# Patient Record
Sex: MALE | Race: BLACK OR AFRICAN AMERICAN | Employment: OTHER | URBAN - METROPOLITAN AREA
[De-identification: names, ages, dates, MRNs, and addresses within clinical notes are randomized per-mention and may not be internally consistent; named-entity substitution may affect disease eponyms.]

---

## 2017-02-02 ENCOUNTER — OFFICE VISIT (OUTPATIENT)
Dept: CARDIOLOGY CLINIC | Age: 82
End: 2017-02-02

## 2017-02-02 VITALS
WEIGHT: 174 LBS | HEART RATE: 64 BPM | SYSTOLIC BLOOD PRESSURE: 177 MMHG | DIASTOLIC BLOOD PRESSURE: 57 MMHG | BODY MASS INDEX: 28.99 KG/M2 | HEIGHT: 65 IN

## 2017-02-02 DIAGNOSIS — Z95.1 POSTSURGICAL AORTOCORONARY BYPASS STATUS: ICD-10-CM

## 2017-02-02 DIAGNOSIS — I34.0 NON-RHEUMATIC MITRAL REGURGITATION: ICD-10-CM

## 2017-02-02 DIAGNOSIS — I10 ESSENTIAL HYPERTENSION: ICD-10-CM

## 2017-02-02 DIAGNOSIS — I25.10 ATHEROSCLEROSIS OF NATIVE CORONARY ARTERY OF NATIVE HEART WITHOUT ANGINA PECTORIS: Primary | ICD-10-CM

## 2017-02-02 DIAGNOSIS — N18.6 ESRD (END STAGE RENAL DISEASE) (HCC): ICD-10-CM

## 2017-02-02 DIAGNOSIS — E78.00 PURE HYPERCHOLESTEROLEMIA: ICD-10-CM

## 2017-02-02 RX ORDER — AMLODIPINE BESYLATE 10 MG/1
10 TABLET ORAL DAILY
Qty: 90 TAB | Refills: 1 | Status: SHIPPED | OUTPATIENT
Start: 2017-02-02 | End: 2018-05-02 | Stop reason: SDUPTHER

## 2017-02-02 NOTE — PATIENT INSTRUCTIONS
After the recommended changes have been made in blood pressure medicines, patient advised to keep BP/HR(pulse rate) chart twice daily and bring us results in next 2 weeks or so. Patient may send the results via \"My Chart\" if desired. Please rest for 5-10 minutes before checking blood pressure  Medications Discontinued During This Encounter   Medication Reason    furosemide (LASIX) 80 mg tablet Duplicate Order    doxazosin (CARDURA) 2 mg tablet Discontinued by Another Clinician    ferrous sulfate 325 mg (65 mg iron) tablet Therapy Completed    oxyCODONE-acetaminophen (PERCOCET) 5-325 mg per tablet Therapy Completed    methocarbamol (ROBAXIN-750) 750 mg tablet Therapy Completed    metolazone (ZAROXOLYN) 2.5 mg tablet Therapy Completed    FOLIC ACID/VITAMIN B COMP W-C (TESSY-MARCK PO) Not A Current Medication    potassium chloride SR (KLOR-CON 10) 10 mEq tablet Not A Current Medication    amLODIPine (NORVASC) 5 mg tablet Reorder       Orders Placed This Encounter    AMB POC EKG ROUTINE W/ 12 LEADS, INTER & REP     Order Specific Question:   Reason for Exam:     Answer:   routine    amLODIPine (NORVASC) 10 mg tablet     Sig: Take 1 Tab by mouth daily. Indications: hypertension     Dispense:  90 Tab     Refill:  1          Learning About Coronary Artery Disease (CAD)  What is coronary artery disease? Coronary artery disease (CAD) occurs when plaque builds up in the arteries that bring oxygen-rich blood to your heart. Plaque is a fatty substance made of cholesterol, calcium, and other substances in the blood. This process is called hardening of the arteries, or atherosclerosis. What happens when you have coronary artery disease? · Plaque may narrow the coronary arteries. Narrowed arteries cause poor blood flow. This can lead to angina symptoms such as chest pain or discomfort. If blood flow is completely blocked, you could have a heart attack.   · You can slow CAD and reduce the risk of future problems by making changes in your lifestyle. These include quitting smoking and eating heart-healthy foods. · Treatments for CAD, along with changes in your lifestyle, can help you live a longer and healthier life. How can you prevent coronary artery disease? · Do not smoke. It may be the best thing you can do to prevent heart disease. If you need help quitting, talk to your doctor about stop-smoking programs and medicines. These can increase your chances of quitting for good. · Be active. Get at least 30 minutes of exercise on most days of the week. Walking is a good choice. You also may want to do other activities, such as running, swimming, cycling, or playing tennis or team sports. · Eat heart-healthy foods. Eat more fruits and vegetables and less foods that contain saturated and trans fats. Limit alcohol, sodium, and sweets. · Stay at a healthy weight. Lose weight if you need to. · Manage other health problems such as diabetes, high blood pressure, and high cholesterol. · Manage stress. Stress can hurt your heart. To keep stress low, talk about your problems and feelings. Don't keep your feelings hidden. · If you have talked about it with your doctor, take a low-dose aspirin every day. Aspirin can help certain people lower their risk of a heart attack or stroke. But taking aspirin isn't right for everyone, because it can cause serious bleeding. Do not start taking daily aspirin unless your doctor knows about it. How is coronary artery disease treated? · Your doctor will suggest that you make lifestyle changes. For example, your doctor may ask you to eat healthy foods, quit smoking, lose extra weight, and be more active. · You will have to take medicines. · Your doctor may suggest a procedure to open narrowed or blocked arteries. This is called angioplasty. Or your doctor may suggest using healthy blood vessels to create detours around narrowed or blocked arteries. This is called bypass surgery.   Follow-up care is a key part of your treatment and safety. Be sure to make and go to all appointments, and call your doctor if you are having problems. It's also a good idea to know your test results and keep a list of the medicines you take. Where can you learn more? Go to http://gustavo-eb.info/. Enter [phone] in the search box to learn more about \"Learning About Coronary Artery Disease (CAD). \"  Current as of: January 27, 2016  Content Version: 11.1  © 8170-9574 [employer]. Care instructions adapted under license by [employer] (which disclaims liability or warranty for this information). If you have questions about a medical condition or this instruction, always ask your healthcare professional. [address] any warranty or liability for your use of this information.

## 2017-02-02 NOTE — LETTER
Dilia Atwood 1935 2/2/2017 Dear MD Carlos Enrique Bueno MD 
 
I had the pleasure of evaluating  Mr. Nadira Giordano in office today. Below are the relevant portions of my assessment and plan of care. ICD-10-CM ICD-9-CM 1. Atherosclerosis of native coronary artery of native heart without angina pectoris I25.10 414.01 AMB POC EKG ROUTINE W/ 12 LEADS, INTER & REP 2. Postsurgical aortocoronary bypass status Z95.1 V45.81   
3. Essential hypertension I10 401.9 amLODIPine (NORVASC) 10 mg tablet 2/17 incr norvasc to 10;   
4. Pure hypercholesterolemia E78.00 272.0   
 8/16 LDL35; 5/16 LDL32; 12/15 LDL21;  
5. Non-rheumatic mitral regurgitation I34.0 424.0   
 2/10 trace MR; 5/14 mild MR 
  
6. ESRD (end stage renal disease) ([facility] [address] N18.6 585.6 HD since 9/16 approx Current Outpatient Prescriptions Medication Sig Dispense Refill  amLODIPine (NORVASC) 10 mg tablet Take 1 Tab by mouth daily. Indications: hypertension 90 Tab 1  
 tamsulosin (FLOMAX) 0.4 mg capsule TAKE ONE CAPSULE BY MOUTH EVERY DAY 90 Cap 0  
 metoprolol succinate (TOPROL-XL) 25 mg XL tablet TAKE 1 TABLET BY MOUTH DAILY 30 Tab 6  furosemide (LASIX) 80 mg tablet TAKE 1 TABLET BY MOUTH DAILY 30 Tab 3  
 sevelamer carbonate (RENVELA) 800 mg tab tab Take 1,600 mg by mouth three (3) times daily.  allopurinol (ZYLOPRIM) 100 mg tablet Take 100 mg by mouth Three (3) times a week. Mondays Wednesday Fridays  atorvastatin (LIPITOR) 80 mg tablet Take 80 mg by mouth nightly. Orders Placed This Encounter  AMB POC EKG ROUTINE W/ 12 LEADS, INTER & REP Order Specific Question:   Reason for Exam: Answer:   routine  amLODIPine (NORVASC) 10 mg tablet Sig: Take 1 Tab by mouth daily. Indications: hypertension Dispense:  90 Tab Refill:  1 If you have questions, please do not hesitate to call me. I look forward to following Mr. Nadira Giordano along with you.  
 
Sincerely, 
 Marlyn Martin MD

## 2017-02-02 NOTE — PROGRESS NOTES
1. Have you been to the ER, urgent care clinic since your last visit? Hospitalized since your last visit? Yes 12/3/16    2. Have you seen or consulted any other health care providers outside of the [address] [place] [address] since your last visit? Include any pap smears or colon screening. No     3. Since your last visit, have you had any of the following symptoms? Swelling in legs    4. Have you had any blood work, X-rays or cardiac testing? Labs in [facility]    5. Where do you normally have your labs drawn? [address]      6. Do you need any refills today?  No     Medications confirmed by patient's medication list

## 2017-02-02 NOTE — PROGRESS NOTES
HISTORY OF PRESENT ILLNESS  Mason Rod is a [de-identified] y.o. male. HPI Comments: F/U od CAD, HTN, HLD, MR, s/p CABG    2/17 HD started approx 9/16. Meds being adjusted and reduced for HTN    Valvular Heart Disease   The history is provided by the medical records (mr). Pertinent negatives include no chest pain, no headaches and no shortness of breath. Leg Swelling   The history is provided by the patient. This is a recurrent problem. The current episode started more than 1 week ago. The problem occurs daily. The problem has been resolved (since HD started). Pertinent negatives include no chest pain, no headaches and no shortness of breath. The symptoms are aggravated by standing. The symptoms are relieved by sleep. Review of Systems   Constitutional: Negative for chills, fever, malaise/fatigue and weight loss. HENT: Negative for nosebleeds. Eyes: Negative for discharge. Respiratory: Negative for cough, shortness of breath and wheezing. Cardiovascular: Positive for leg swelling. Negative for chest pain, palpitations, orthopnea, claudication and PND. Gastrointestinal: Negative for diarrhea, nausea and vomiting. Genitourinary: Negative for dysuria and hematuria. Musculoskeletal: Negative for joint pain. Skin: Negative for rash. Neurological: Negative for dizziness, seizures, loss of consciousness and headaches. Endo/Heme/Allergies: Negative for polydipsia. Does not bruise/bleed easily. Psychiatric/Behavioral: Negative for depression and substance abuse. The patient does not have insomnia.       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 Miriam Mckeon Bradycardia 12/21/2015     12/15 lopressor d/neela by Dr Junior Burgos, was taking 100 mg will start 25 mg and watch for tamara     CAD (coronary artery disease)     Chronic kidney disease     Coronary atherosclerosis of native coronary artery      Stable symptoms    Essential hypertension, benign      Controlled    Hypercholesteremia     Mitral valve disorders     Nausea & vomiting     Other and unspecified hyperlipidemia      3/12 Low density lipoproteins (LDLs) are at goal, triglycerides are at goal, high density lipoproteins (HDLs) are at goal    Postsurgical aortocoronary bypass status 1991    Prostate disease        Family History   Problem Relation Age of Onset    Heart Disease Neg Hx     Heart Attack Neg Hx     Sudden Death Neg Hx     Stroke Neg Hx        Social History   Substance Use Topics    Smoking status: Former Smoker     Quit date: 11/1/1983    Smokeless tobacco: Never Used    Alcohol use No        Current Outpatient Prescriptions   Medication Sig    tamsulosin (FLOMAX) 0.4 mg capsule TAKE ONE CAPSULE BY MOUTH EVERY DAY    amLODIPine (NORVASC) 5 mg tablet Take 1 Tab by mouth daily. Indications: HYPERTENSION    metoprolol succinate (TOPROL-XL) 25 mg XL tablet TAKE 1 TABLET BY MOUTH DAILY    furosemide (LASIX) 80 mg tablet TAKE 1 TABLET BY MOUTH DAILY    sevelamer carbonate (RENVELA) 800 mg tab tab Take 1,600 mg by mouth three (3) times daily.  allopurinol (ZYLOPRIM) 100 mg tablet Take 100 mg by mouth Three (3) times a week. Mondays Wednesday Fridays    atorvastatin (LIPITOR) 80 mg tablet Take 80 mg by mouth nightly. No current facility-administered medications for this visit.          Past Surgical History   Procedure Laterality Date    Hx coronary artery bypass graft  1991     x 1    Pr cardiac surg procedure unlist         Diagnostic Studies:  CARDIOLOGY STUDIES 6/12/2015 5/23/2014 2/1/2010   Myocardial Perfusion Scan Result - - nl scan, EF 63%   Pharmacological Nuclear Stress Test Result nl scan, 72%EF - -   Echocardiogram - Complete Result - 55%EF, mod DD, mild MR, Ao root 3.9, asc ao 3.8 trace MR, trace TR, PAP 22       Visit Vitals    /57    Pulse 64    Ht 5' 5\" (1.651 m)    Wt 78.9 kg (174 lb)    BMI 28.96 kg/m2       Mr. Alanis Serna has a reminder for a \"due or due soon\" health maintenance. I have asked that he contact his primary care provider for follow-up on this health maintenance. Physical Exam   Constitutional: He is oriented to person, place, and time. He appears well-developed and well-nourished. No distress. HENT:   Head: Normocephalic and atraumatic. Mouth/Throat: Normal dentition. Eyes: Right eye exhibits no discharge. Left eye exhibits no discharge. No scleral icterus. Neck: Neck supple. No JVD present. Carotid bruit is not present. No thyromegaly present. Cardiovascular: Normal rate, regular rhythm, S1 normal, S2 normal and intact distal pulses. Exam reveals no gallop and no friction rub. Murmur heard. Blowing early systolic murmur is present with a grade of 3/6  at the upper right sternal border, apex radiating to the neck  Pulmonary/Chest: Effort normal and breath sounds normal. He has no wheezes. He has no rales. Abdominal: Soft. He exhibits no mass. There is no tenderness. Musculoskeletal: He exhibits no edema. Lymphadenopathy:        Right cervical: No superficial cervical adenopathy present. Left cervical: No superficial cervical adenopathy present. Neurological: He is alert and oriented to person, place, and time. Skin: Skin is warm and dry. No rash noted. Psychiatric: He has a normal mood and affect. His behavior is normal.       ASSESSMENT and Henrocio Gonsalves was seen today for coronary artery disease, valvular heart disease, heart murmur and leg swelling.     Diagnoses and all orders for this visit:    Atherosclerosis of native coronary artery of native heart without angina pectoris  -     AMB POC EKG ROUTINE W/ 12 LEADS, INTER & REP    Postsurgical aortocoronary bypass status    Essential hypertension  Comments:  2/17 larissa tovarc to 10;   Orders:  -     amLODIPine (NORVASC) 10 mg tablet; Take 1 Tab by mouth daily. Indications: hypertension    Pure hypercholesterolemia  Comments:  8/16 LDL35; 5/16 LDL32; 12/15 LDL21;    Non-rheumatic mitral regurgitation  Comments:  2/10 trace MR; 5/14 mild MR      ESRD (end stage renal disease) ([facility] [address]  Comments:  HD since 9/16 approx          Pertinent laboratory and test data reviewed and discussed with patient. See patient instructions also for other medical advice given    Medications Discontinued During This Encounter   Medication Reason    furosemide (LASIX) 80 mg tablet Duplicate Order    doxazosin (CARDURA) 2 mg tablet Discontinued by Another Clinician    ferrous sulfate 325 mg (65 mg iron) tablet Therapy Completed    oxyCODONE-acetaminophen (PERCOCET) 5-325 mg per tablet Therapy Completed    methocarbamol (ROBAXIN-750) 750 mg tablet Therapy Completed    metolazone (ZAROXOLYN) 2.5 mg tablet Therapy Completed    FOLIC ACID/VITAMIN B COMP W-C (TESSY-MARCK PO) Not A Current Medication    potassium chloride SR (KLOR-CON 10) 10 mEq tablet Not A Current Medication    amLODIPine (NORVASC) 5 mg tablet Reorder       Follow-up Disposition:  Return in about 6 months (around 8/2/2017), or if symptoms worsen or fail to improve.

## 2017-02-02 NOTE — MR AVS SNAPSHOT
Visit Information Date & Time Provider Department Dept. Phone Encounter #  
 2/2/2017  8:30 AM Gilmer Parker MD [facility] [address] [place] [address] [id number] Follow-up Instructions Return in about 6 months (around 8/2/2017), or if symptoms worsen or fail to improve. Your Appointments 8/17/2017  8:15 AM  
ESTABLISHED PATIENT with Gilmer Parker MD  
[facility]) Appt Note: 6 months [address] [facility], [address] [place] [address]  
[address], [address] [place] [address] Upcoming Health Maintenance Date Due  
 FOOT EXAM Q1 12/27/1945 MICROALBUMIN Q1 12/27/1945 EYE EXAM RETINAL OR DILATED Q1 12/27/1946 DTaP/Tdap/Td series (1 - Tdap) 12/27/1957 ZOSTER VACCINE AGE 60> 12/27/1996 GLAUCOMA SCREENING Q2Y 12/27/2001 Pneumococcal 65+ High/Highest Risk (1 of 2 - PCV13) 12/27/2001 MEDICARE YEARLY EXAM 12/27/2001 INFLUENZA AGE 9 TO ADULT 8/1/2016 HEMOGLOBIN A1C Q6M 2/8/2017 LIPID PANEL Q1 8/8/2017 Allergies as of 2/2/2017  Review Complete On: 2/2/2017 By: Gilmer Parker MD  
  
 Severity Noted Reaction Type Reactions Other Medication  05/09/2013    Unknown (comments) ANESTHESIA Current Immunizations  Never Reviewed No immunizations on file. Not reviewed this visit You Were Diagnosed With   
  
 Codes Comments Atherosclerosis of native coronary artery of native heart without angina pectoris    -  Primary ICD-10-CM: I25.10 ICD-9-CM: 414.01 Postsurgical aortocoronary bypass status     ICD-10-CM: Z95.1 ICD-9-CM: V45.81 Essential hypertension     ICD-10-CM: I10 
ICD-9-CM: 401.9 2/17 incr norvasc to 10;   
 Pure hypercholesterolemia     ICD-10-CM: E78.00 ICD-9-CM: 272.0 8/16 LDL35; 5/16 LDL32; 12/15 LDL21;  
 Non-rheumatic mitral regurgitation     ICD-10-CM: I34.0 ICD-9-CM: 424.0 2/10 trace MR; 5/14 mild MR 
  
 [facility]     ICD-10-CM: N18.6 ICD-9-CM: 585.6 HD since 9/16 approx Vitals BP Pulse Height(growth percentile) Weight(growth percentile) BMI Smoking Status 177/57 64 5' 5\" (1.651 m) 174 lb (78.9 kg) 28.96 kg/m2 Former Smoker Vitals History BMI and BSA Data Body Mass Index Body Surface Area  
 28.96 kg/m 2 1.9 m 2 Preferred Pharmacy Pharmacy Name Phone [facility] [address] [place] [address] [phone] Your Updated Medication List  
  
   
This list is accurate as of: 2/2/17  8:58 AM.  Always use your most recent med list.  
  
  
  
  
 allopurinol 100 mg tablet Commonly known as:  Floresita Avinash Take 100 mg by mouth Three (3) times a week. Mondays Wednesday Fridays  
  
 amLODIPine 10 mg tablet Commonly known as:  Love Breach Take 1 Tab by mouth daily. Indications: hypertension  
  
 atorvastatin 80 mg tablet Commonly known as:  LIPITOR Take 80 mg by mouth nightly. furosemide 80 mg tablet Commonly known as:  LASIX TAKE 1 TABLET BY MOUTH DAILY  
  
 metoprolol succinate 25 mg XL tablet Commonly known as:  TOPROL-XL  
TAKE 1 TABLET BY MOUTH DAILY RENVELA 800 mg Tab tab Generic drug:  sevelamer carbonate Take 1,600 mg by mouth three (3) times daily. tamsulosin 0.4 mg capsule Commonly known as:  FLOMAX TAKE ONE CAPSULE BY MOUTH EVERY DAY Prescriptions Sent to Pharmacy Refills  
 amLODIPine (NORVASC) 10 mg tablet 1 Sig: Take 1 Tab by mouth daily. Indications: hypertension Class: Normal  
 Pharmacy: [facility] [address] [place] [address] Ph #: [phone] Route: Oral  
  
We Performed the Following AMB POC EKG ROUTINE W/ 12 LEADS, INTER & REP [16315 CPT(R)] Follow-up Instructions  Return in about 6 months (around 8/2/2017), or if symptoms worsen or fail to improve. Patient Instructions After the recommended changes have been made in blood pressure medicines, patient advised to keep BP/HR(pulse rate) chart twice daily and bring us results in next 2 weeks or so. Patient may send the results via \"My Chart\" if desired. Please rest for 5-10 minutes before checking blood pressure Medications Discontinued During This Encounter Medication Reason  furosemide (LASIX) 80 mg tablet Duplicate Order  doxazosin (CARDURA) 2 mg tablet Discontinued by Another Clinician  ferrous sulfate 325 mg (65 mg iron) tablet Therapy Completed  oxyCODONE-acetaminophen (PERCOCET) 5-325 mg per tablet Therapy Completed  methocarbamol (ROBAXIN-750) 750 mg tablet Therapy Completed  metolazone (ZAROXOLYN) 2.5 mg tablet Therapy Completed  FOLIC ACID/VITAMIN B COMP W-C (TESSY-MARCK PO) Not A Current Medication  potassium chloride SR (KLOR-CON 10) 10 mEq tablet Not A Current Medication  amLODIPine (NORVASC) 5 mg tablet Reorder Orders Placed This Encounter  AMB POC EKG ROUTINE W/ 12 LEADS, INTER & REP Order Specific Question:   Reason for Exam: Answer:   routine  amLODIPine (NORVASC) 10 mg tablet Sig: Take 1 Tab by mouth daily. Indications: hypertension Dispense:  90 Tab Refill:  1 Learning About Coronary Artery Disease (CAD) What is coronary artery disease? Coronary artery disease (CAD) occurs when plaque builds up in the arteries that bring oxygen-rich blood to your heart. Plaque is a fatty substance made of cholesterol, calcium, and other substances in the blood. This process is called hardening of the arteries, or atherosclerosis. What happens when you have coronary artery disease? · Plaque may narrow the coronary arteries. Narrowed arteries cause poor blood flow. This can lead to angina symptoms such as chest pain or discomfort. If blood flow is completely blocked, you could have a heart attack. · You can slow CAD and reduce the risk of future problems by making changes in your lifestyle. These include quitting smoking and eating heart-healthy foods. · Treatments for CAD, along with changes in your lifestyle, can help you live a longer and healthier life. How can you prevent coronary artery disease? · Do not smoke. It may be the best thing you can do to prevent heart disease. If you need help quitting, talk to your doctor about stop-smoking programs and medicines. These can increase your chances of quitting for good. · Be active. Get at least 30 minutes of exercise on most days of the week. Walking is a good choice. You also may want to do other activities, such as running, swimming, cycling, or playing tennis or team sports. · Eat heart-healthy foods. Eat more fruits and vegetables and less foods that contain saturated and trans fats. Limit alcohol, sodium, and sweets. · Stay at a healthy weight. Lose weight if you need to. · Manage other health problems such as diabetes, high blood pressure, and high cholesterol. · Manage stress. Stress can hurt your heart. To keep stress low, talk about your problems and feelings. Don't keep your feelings hidden. · If you have talked about it with your doctor, take a low-dose aspirin every day. Aspirin can help certain people lower their risk of a heart attack or stroke. But taking aspirin isn't right for everyone, because it can cause serious bleeding. Do not start taking daily aspirin unless your doctor knows about it. How is coronary artery disease treated? · Your doctor will suggest that you make lifestyle changes. For example, your doctor may ask you to eat healthy foods, quit smoking, lose extra weight, and be more active. · You will have to take medicines. · Your doctor may suggest a procedure to open narrowed or blocked arteries. This is called angioplasty.  Or your doctor may suggest using healthy blood vessels to create detours around narrowed or blocked arteries. This is called bypass surgery. Follow-up care is a key part of your treatment and safety. Be sure to make and go to all appointments, and call your doctor if you are having problems. It's also a good idea to know your test results and keep a list of the medicines you take. Where can you learn more? Go to http://gustavo-eb.info/. Enter ([phone] in the search box to learn more about \"Learning About Coronary Artery Disease (CAD). \" Current as of: January 27, 2016 Content Version: 11.1 © 6186-9970 [employer]. Care instructions adapted under license by [employer] (which disclaims liability or warranty for this information). If you have questions about a medical condition or this instruction, always ask your healthcare professional. [address] any warranty or liability for your use of this information. Please provide this summary of care documentation to your next provider. Your primary care clinician is listed as Donovan Ho. If you have any questions after today's visit, please call [phone].

## 2017-03-02 ENCOUNTER — TELEPHONE (OUTPATIENT)
Dept: CARDIOLOGY CLINIC | Age: 82
End: 2017-03-02

## 2017-03-02 NOTE — TELEPHONE ENCOUNTER
Daily blood pressure log 2/16 -3/1/17    Per Dr. Kishore Lozoya reviewed BP log and wrote on log to continue  Norvasc 10 mg 1 tab daily/ BP chart 1 week/ bring in late march

## 2017-03-10 ENCOUNTER — DOCUMENTATION ONLY (OUTPATIENT)
Dept: VASCULAR SURGERY | Age: 82
End: 2017-03-10

## 2017-03-23 ENCOUNTER — HOSPITAL ENCOUNTER (OUTPATIENT)
Dept: CARDIAC CATH/INVASIVE PROCEDURES | Age: 82
Discharge: HOME | End: 2017-03-23
Attending: SURGERY | Admitting: SURGERY
Payer: MEDICARE

## 2017-03-23 VITALS
DIASTOLIC BLOOD PRESSURE: 55 MMHG | TEMPERATURE: 98.1 F | RESPIRATION RATE: 22 BRPM | SYSTOLIC BLOOD PRESSURE: 164 MMHG | OXYGEN SATURATION: 97 % | HEIGHT: 66 IN | BODY MASS INDEX: 28.12 KG/M2 | WEIGHT: 175 LBS | HEART RATE: 62 BPM

## 2017-03-23 LAB
BUN BLD-MCNC: 30 MG/DL (ref 7–18)
CHLORIDE BLD-SCNC: 109 MMOL/L (ref 100–108)
GLUCOSE BLD STRIP.AUTO-MCNC: 94 MG/DL (ref 74–106)
HCT VFR BLD CALC: 44 % (ref 36–49)
HGB BLD-MCNC: 15 G/DL (ref 12–16)
POTASSIUM BLD-SCNC: 4 MMOL/L (ref 3.5–5.5)
SODIUM BLD-SCNC: 135 MMOL/L (ref 136–145)

## 2017-03-23 PROCEDURE — C1894 INTRO/SHEATH, NON-LASER: HCPCS

## 2017-03-23 PROCEDURE — 36903 INTRO CATH DIALYSIS CIRCUIT: CPT

## 2017-03-23 PROCEDURE — 85014 HEMATOCRIT: CPT

## 2017-03-23 PROCEDURE — 74011000250 HC RX REV CODE- 250: Performed by: SURGERY

## 2017-03-23 PROCEDURE — 99152 MOD SED SAME PHYS/QHP 5/>YRS: CPT

## 2017-03-23 PROCEDURE — 77030037100 HC DEV INFL ANGIO PRESTO DISP BARD -B

## 2017-03-23 PROCEDURE — 74011250636 HC RX REV CODE- 250/636: Performed by: SURGERY

## 2017-03-23 PROCEDURE — 74011636320 HC RX REV CODE- 636/320: Performed by: SURGERY

## 2017-03-23 PROCEDURE — 99153 MOD SED SAME PHYS/QHP EA: CPT

## 2017-03-23 PROCEDURE — 76937 US GUIDE VASCULAR ACCESS: CPT

## 2017-03-23 PROCEDURE — C1725 CATH, TRANSLUMIN NON-LASER: HCPCS

## 2017-03-23 PROCEDURE — C1769 GUIDE WIRE: HCPCS

## 2017-03-23 PROCEDURE — 77030002916 HC SUT ETHLN J&J -A

## 2017-03-23 RX ORDER — LIDOCAINE HYDROCHLORIDE 10 MG/ML
1-30 INJECTION, SOLUTION EPIDURAL; INFILTRATION; INTRACAUDAL; PERINEURAL
Status: DISCONTINUED | OUTPATIENT
Start: 2017-03-23 | End: 2017-03-23 | Stop reason: HOSPADM

## 2017-03-23 RX ORDER — SODIUM CHLORIDE 0.9 % (FLUSH) 0.9 %
5-10 SYRINGE (ML) INJECTION EVERY 8 HOURS
Status: DISCONTINUED | OUTPATIENT
Start: 2017-03-23 | End: 2017-03-23 | Stop reason: HOSPADM

## 2017-03-23 RX ORDER — MIDAZOLAM HYDROCHLORIDE 1 MG/ML
1-4 INJECTION, SOLUTION INTRAMUSCULAR; INTRAVENOUS
Status: DISCONTINUED | OUTPATIENT
Start: 2017-03-23 | End: 2017-03-23 | Stop reason: HOSPADM

## 2017-03-23 RX ORDER — MORPHINE SULFATE 10 MG/ML
1 INJECTION, SOLUTION INTRAMUSCULAR; INTRAVENOUS
Status: DISCONTINUED | OUTPATIENT
Start: 2017-03-23 | End: 2017-03-23 | Stop reason: HOSPADM

## 2017-03-23 RX ORDER — OXYCODONE AND ACETAMINOPHEN 5; 325 MG/1; MG/1
1 TABLET ORAL
Status: DISCONTINUED | OUTPATIENT
Start: 2017-03-23 | End: 2017-03-23 | Stop reason: HOSPADM

## 2017-03-23 RX ORDER — ACETAMINOPHEN 325 MG/1
650 TABLET ORAL
Status: DISCONTINUED | OUTPATIENT
Start: 2017-03-23 | End: 2017-03-23 | Stop reason: HOSPADM

## 2017-03-23 RX ORDER — HEPARIN SODIUM 1000 [USP'U]/ML
1000-10000 INJECTION, SOLUTION INTRAVENOUS; SUBCUTANEOUS
Status: DISCONTINUED | OUTPATIENT
Start: 2017-03-23 | End: 2017-03-23 | Stop reason: HOSPADM

## 2017-03-23 RX ORDER — HEPARIN SODIUM 200 [USP'U]/100ML
1000 INJECTION, SOLUTION INTRAVENOUS ONCE
Status: COMPLETED | OUTPATIENT
Start: 2017-03-23 | End: 2017-03-23

## 2017-03-23 RX ORDER — ONDANSETRON 2 MG/ML
4 INJECTION INTRAMUSCULAR; INTRAVENOUS
Status: DISCONTINUED | OUTPATIENT
Start: 2017-03-23 | End: 2017-03-23 | Stop reason: HOSPADM

## 2017-03-23 RX ORDER — FENTANYL CITRATE 50 UG/ML
25-100 INJECTION, SOLUTION INTRAMUSCULAR; INTRAVENOUS
Status: DISCONTINUED | OUTPATIENT
Start: 2017-03-23 | End: 2017-03-23 | Stop reason: HOSPADM

## 2017-03-23 RX ORDER — DIPHENHYDRAMINE HYDROCHLORIDE 50 MG/ML
12.5 INJECTION, SOLUTION INTRAMUSCULAR; INTRAVENOUS
Status: DISCONTINUED | OUTPATIENT
Start: 2017-03-23 | End: 2017-03-23 | Stop reason: HOSPADM

## 2017-03-23 RX ORDER — SODIUM CHLORIDE 0.9 % (FLUSH) 0.9 %
5-10 SYRINGE (ML) INJECTION AS NEEDED
Status: DISCONTINUED | OUTPATIENT
Start: 2017-03-23 | End: 2017-03-23 | Stop reason: HOSPADM

## 2017-03-23 RX ADMIN — HEPARIN SODIUM 3000 UNITS: 1000 INJECTION, SOLUTION INTRAVENOUS; SUBCUTANEOUS at 13:54

## 2017-03-23 RX ADMIN — MIDAZOLAM HYDROCHLORIDE 1 MG: 1 INJECTION, SOLUTION INTRAMUSCULAR; INTRAVENOUS at 13:42

## 2017-03-23 RX ADMIN — IOPAMIDOL 15 ML: 612 INJECTION, SOLUTION INTRAVENOUS at 14:10

## 2017-03-23 RX ADMIN — FENTANYL CITRATE 50 MCG: 50 INJECTION INTRAMUSCULAR; INTRAVENOUS at 13:55

## 2017-03-23 RX ADMIN — LIDOCAINE HYDROCHLORIDE 2 ML: 10 INJECTION, SOLUTION EPIDURAL; INFILTRATION; INTRACAUDAL; PERINEURAL at 13:48

## 2017-03-23 RX ADMIN — MIDAZOLAM HYDROCHLORIDE 1 MG: 1 INJECTION, SOLUTION INTRAMUSCULAR; INTRAVENOUS at 13:55

## 2017-03-23 RX ADMIN — FENTANYL CITRATE 50 MCG: 50 INJECTION INTRAMUSCULAR; INTRAVENOUS at 13:42

## 2017-03-23 RX ADMIN — HEPARIN SODIUM 1000 UNITS: 200 INJECTION, SOLUTION INTRAVENOUS at 13:49

## 2017-03-23 NOTE — ROUTINE PROCESS
Pt discharge instructions reviewed with patient and daughter; verbalize understanding. PIV removed, fistula site c/d/i. Discharged home.

## 2017-03-23 NOTE — DISCHARGE INSTRUCTIONS
Your Hemodialysis Access: Care Instructions  Your Care Instructions  Hemodialysis, or dialysis, is the use of a machine to remove wastes from your blood. You need it if your kidneys are not able to remove wastes on their own. A dialysis access is the place in your arm, or sometimes in your leg, where a doctor creates a blood vessel that carries a large flow of blood. When you have dialysis, two needles are placed in this blood vessel and are connected to the dialysis machine. Your blood flows out of one needle and into the machine to be cleaned. Then your cleaned blood flows back into your body through the other needle. Sometimes, a doctor makes a short-term access through a tube, called a catheter, placed in your neck, upper chest, or groin. Your doctor creates an access during a minor surgery. You need to take care of your access to keep it working and to prevent infection. Follow-up care is a key part of your treatment and safety. Be sure to make and go to all appointments, and call your doctor if you are having problems. Its also a good idea to know your test results and keep a list of the medicines you take. How can you care for yourself at home? · After your doctor creates an access, keep it dry for at least 2 days. · Squeeze a soft ball or other object as instructed after the access is placed. This will help blood flow through the access and help prevent blood clots. · After you have dialysis, check to see whether the access bleeds or swells. Let your doctor know if your arm bleeds or swells. · Do not lift anything heavy with the arm that has the access. · Do not bump your arm. · Do not wear tight clothing or jewelry over the access. · Do not sleep with your access arm under your body. · Have blood drawn or blood pressure taken from your other arm. · Keep the access clean and dry. · Do not put cream or lotion on or near the access. When should you call for help?   Call your doctor now or seek immediate medical care if:  · You have signs of infection, such as:  ¨ Increased pain, swelling, warmth, or redness around the access. ¨ Red streaks leading from the access. ¨ Pus draining from the access. ¨ Swollen lymph nodes in your neck, armpits, or groin. ¨ A fever. · You do not feel a pulse in your access. Watch closely for changes in your health, and be sure to contact your doctor if:  · You have pain, swelling, or bleeding. Some pain or swelling is normal after surgery to create the access. But pain and swelling should get better over time. Where can you learn more? Go to http://gustavo-eb.info/. Enter L169 in the search box to learn more about \"Your Hemodialysis Access: Care Instructions. \"  Current as of: November 20, 2015  Content Version: 11.1  © 2464-3338 [employer]. Care instructions adapted under license by [employer] (which disclaims liability or warranty for this information). If you have questions about a medical condition or this instruction, always ask your healthcare professional. [address] any warranty or liability for your use of this information.

## 2017-03-23 NOTE — ROUTINE PROCESS
TRANSFER - OUT REPORT:    Verbal report given to MEGGAN OROZCO RN(name) on Julio Fallon  being transferred to [facility](unit) for routine progression of care       Report consisted of patients Situation, Background, Assessment and   Recommendations(SBAR). Information from the following report(s) SBAR, Procedure Summary and MAR was reviewed with the receiving nurse. Lines:       Opportunity for questions and clarification was provided.       Patient transported with:   [employer]

## 2017-03-23 NOTE — H&P
Surgery History and Physical    Subjective:      Giulia Reis is a [de-identified] y.o. male who presents with difficulty with left arm dialysis access.     Patient Active Problem List    Diagnosis Date Noted    Bradycardia 12/21/2015    ESRD (end stage renal disease) ([facility] [address] 12/14/2015    Undiagnosed cardiac murmurs 05/14/2014    Coronary atherosclerosis of native coronary artery     HLD (hyperlipidemia)     Postsurgical aortocoronary bypass status     Essential hypertension     Mitral regurgitation     BPH (benign prostatic hyperplasia) 03/14/2013    Lower urinary tract symptoms (LUTS) 03/14/2013    Elevated prostate specific antigen (PSA) 03/14/2013    Renal insufficiency 03/14/2013     Past Medical History:   Diagnosis Date    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 Allen Police Bradycardia 12/21/2015    12/15 lopressor d/neela by Dr [place] De Leon, was taking 100 mg will start 25 mg and watch for tamara     CAD (coronary artery disease)     Chronic kidney disease     Coronary atherosclerosis of native coronary artery     Stable symptoms    Essential hypertension, benign     Controlled    Hypercholesteremia     Mitral valve disorders     Nausea & vomiting     Other and unspecified hyperlipidemia     3/12 Low density lipoproteins (LDLs) are at goal, triglycerides are at goal, high density lipoproteins (HDLs) are at goal    Postsurgical aortocoronary bypass status 1991    Prostate disease       Past Surgical History:   Procedure Laterality Date    CARDIAC SURG PROCEDURE UNLIST      HX CORONARY ARTERY BYPASS GRAFT  1991    x 1      Social History   Substance Use Topics    Smoking status: Former Smoker     Quit date: 11/1/1983    Smokeless tobacco: Never Used    Alcohol use No      Family History   Problem Relation Age of Onset    Heart Disease Neg Hx     Heart Attack Neg Hx     Sudden Death Neg Hx     Stroke Neg Hx Prior to Admission medications    Medication Sig Start Date End Date Taking? Authorizing Provider   amLODIPine (NORVASC) 10 mg tablet Take 1 Tab by mouth daily. Indications: hypertension 17   Massiel Edmonds MD   tamsulosin (FLOMAX) 0.4 mg capsule TAKE ONE CAPSULE BY MOUTH EVERY DAY 16   Arvin Beach MD   metoprolol succinate (TOPROL-XL) 25 mg XL tablet TAKE 1 TABLET BY MOUTH DAILY 16   Magali Santos NP   furosemide (LASIX) 80 mg tablet TAKE 1 TABLET BY MOUTH DAILY 16   Magali Santos NP   sevelamer carbonate (RENVELA) 800 mg tab tab Take 1,600 mg by mouth three (3) times daily. Historical Provider   allopurinol (ZYLOPRIM) 100 mg tablet Take 100 mg by mouth Three (3) times a week.     Historical Provider   atorvastatin (LIPITOR) 80 mg tablet Take 80 mg by mouth nightly. Db Kennedy, MD     Allergies   Allergen Reactions    Other Medication Unknown (comments)     ANESTHESIA         ROS:  Pertinent items are noted in HPI. Unless otherwise mentioned in the HPI. Objective:     Patient Vitals for the past 8 hrs:   Temp Height Weight   17 1321 98.1 °F (36.7 °C) 5' 6\" (1.676 m) 175 lb (79.4 kg)       Temp (24hrs), Av.1 °F (36.7 °C), Min:98.1 °F (36.7 °C), Max:98.1 °F (36.7 °C)      Physical Exam:  GENERAL: alert, cooperative, no distress, appears stated age, THROAT & NECK: normal and no erythema or exudates noted. , LUNG: clear to auscultation bilaterally, HEART: regular rate and rhythm, S1, S2 normal, no murmur, click, rub or gallop, ABDOMEN: soft, non-tender.  Bowel sounds normal. No masses,  no organomegaly    Labs:   Recent Results (from the past 24 hour(s))   POC 6 PLUS    Collection Time: 17  1:22 PM   Result Value Ref Range    Sodium (POC) 135 (L) 136 - 145 MMOL/L    Potassium (POC) 4.0 3.5 - 5.5 MMOL/L    Chloride (POC) 109 (H) 100 - 108 MMOL/L    BUN (POC) 30 (H) 7 - 18 MG/DL    Glucose (POC) 94 74 - 106 MG/DL    Hematocrit (POC) 44 36 - 49 %    Hemoglobin (POC) 15.0 12 - 16 G/DL       Data Review:    CBC:   Lab Results   Component Value Date/Time    WBC 5.1 09/30/2016 09:16 AM    RBC 4.44 09/30/2016 09:16 AM    HGB 11.3 09/30/2016 09:16 AM    HCT 35.8 09/30/2016 09:16 AM    PLATELET 649 25/12/9633 09:16 AM      BMP:   Lab Results   Component Value Date/Time    Glucose 98 12/20/2016 07:14 AM    Sodium 138 12/20/2016 07:14 AM    Potassium 4.5 12/20/2016 07:14 AM    Chloride 103 12/20/2016 07:14 AM    CO2 26 12/20/2016 07:14 AM    BUN 39 12/20/2016 07:14 AM    Creatinine 4.06 12/20/2016 07:14 AM    Calcium 8.8 12/20/2016 07:14 AM     Coagulation:   Lab Results   Component Value Date/Time    Prothrombin time 13.2 09/30/2016 09:16 AM    INR 1.0 09/30/2016 09:16 AM       Assessment:     Active Problems:    * No active hospital problems.  *      Plan:     Left arm fistulagram.    Signed By: Cortez Warren MD     March 23, 2017

## 2017-03-23 NOTE — IP AVS SNAPSHOT
[address] [id number] [facility] [address] Patient: Gerard Whitmore MRN: [id number] :1935 You are allergic to the following Allergen Reactions Other Medication Unknown (comments) ANESTHESIA Recent Documentation Height Weight BMI Smoking Status 1.676 m 79.4 kg 28.25 kg/m2 Former Smoker Emergency Contacts Name Discharge Info Relation Home Work Mobile [address] [facility] CAREGIVER [3] Daughter [21] [phone] [address] CAREGIVER [3] Son [22] [phone] Toy Gomez  Child [2] [phone] About your hospitalization You were admitted on:  2017 You last received care in the:  [facility] You were discharged on:  2017 Unit phone number:  [phone] Why you were hospitalized Your primary diagnosis was:  Not on File Providers Seen During Your Hospitalizations Provider Role Specialty Primary office phone Gabbi Mohamud MD Attending Provider Vascular Surgery [phone] Your Primary Care Physician (PCP) Primary Care Physician Office Phone Office Fax Anastacio Marcial [phone] Follow-up Information Follow up With Details Comments Contact Info Bernadine Sorensen MD   [address] [place] [address] [address] [facility] [id number] [phone] Gabbi Mohamud MD Schedule an appointment as soon as possible for a visit in 5 days  [address] [place] [address] [address] 
[facility] [address] [phone] Your Appointments  10:15 AM EDT HOSPITAL DISCHARGE with Nilam Valencia  
[facility] ([address]) [address] [facility] [address] Current Discharge Medication List  
  
 CONTINUE these medications which have NOT CHANGED Dose & Instructions Dispensing Information Comments Morning Noon Evening Bedtime  
 allopurinol 100 mg tablet Commonly known as:  Cheryl Leary Your last dose was: Your next dose is:    
   
   
 Dose:  100 mg Take 100 mg by mouth Three (3) times a week. Mondays Wednesday Fridays Refills:  0  
     
   
   
   
  
 amLODIPine 10 mg tablet Commonly known as:  Maeve Fraction Your last dose was: Your next dose is:    
   
   
 Dose:  10 mg Take 1 Tab by mouth daily. Indications: hypertension Quantity:  90 Tab Refills:  1  
     
   
   
   
  
 atorvastatin 80 mg tablet Commonly known as:  LIPITOR Your last dose was: Your next dose is:    
   
   
 Dose:  80 mg Take 80 mg by mouth nightly. Refills:  0  
     
   
   
   
  
 furosemide 80 mg tablet Commonly known as:  LASIX Your last dose was: Your next dose is: TAKE 1 TABLET BY MOUTH DAILY Quantity:  30 Tab Refills:  3  
     
   
   
   
  
 metoprolol succinate 25 mg XL tablet Commonly known as:  TOPROL-XL Your last dose was: Your next dose is: TAKE 1 TABLET BY MOUTH DAILY Quantity:  30 Tab Refills:  6 RENVELA 800 mg Tab tab Generic drug:  sevelamer carbonate Your last dose was: Your next dose is:    
   
   
 Dose:  1600 mg Take 1,600 mg by mouth three (3) times daily. Refills:  0  
     
   
   
   
  
 tamsulosin 0.4 mg capsule Commonly known as:  FLOMAX Your last dose was: Your next dose is: TAKE ONE CAPSULE BY MOUTH EVERY DAY Quantity:  90 Cap Refills:  0 Discharge Instructions Your Hemodialysis Access: Care Instructions Your Care Instructions Hemodialysis, or dialysis, is the use of a machine to remove wastes from your blood. You need it if your kidneys are not able to remove wastes on their own. A dialysis access is the place in your arm, or sometimes in your leg, where a doctor creates a blood vessel that carries a large flow of blood. When you have dialysis, two needles are placed in this blood vessel and are connected to the dialysis machine. Your blood flows out of one needle and into the machine to be cleaned. Then your cleaned blood flows back into your body through the other needle. Sometimes, a doctor makes a short-term access through a tube, called a catheter, placed in your neck, upper chest, or groin. Your doctor creates an access during a minor surgery. You need to take care of your access to keep it working and to prevent infection. Follow-up care is a key part of your treatment and safety. Be sure to make and go to all appointments, and call your doctor if you are having problems. Its also a good idea to know your test results and keep a list of the medicines you take. How can you care for yourself at home? · After your doctor creates an access, keep it dry for at least 2 days. · Squeeze a soft ball or other object as instructed after the access is placed. This will help blood flow through the access and help prevent blood clots. · After you have dialysis, check to see whether the access bleeds or swells. Let your doctor know if your arm bleeds or swells. · Do not lift anything heavy with the arm that has the access. · Do not bump your arm. · Do not wear tight clothing or jewelry over the access. · Do not sleep with your access arm under your body. · Have blood drawn or blood pressure taken from your other arm. · Keep the access clean and dry. · Do not put cream or lotion on or near the access. When should you call for help? Call your doctor now or seek immediate medical care if: 
· You have signs of infection, such as: 
¨ Increased pain, swelling, warmth, or redness around the access. ¨ Red streaks leading from the access. ¨ Pus draining from the access. ¨ Swollen lymph nodes in your neck, armpits, or groin. ¨ A fever. · You do not feel a pulse in your access. Watch closely for changes in your health, and be sure to contact your doctor if: 
· You have pain, swelling, or bleeding. Some pain or swelling is normal after surgery to create the access. But pain and swelling should get better over time. Where can you learn more? Go to http://gustavo-eb.info/. Enter L169 in the search box to learn more about \"Your Hemodialysis Access: Care Instructions. \" Current as of: November 20, 2015 Content Version: 11.1 © 5339-1360 [employer]. Care instructions adapted under license by [employer] (which disclaims liability or warranty for this information). If you have questions about a medical condition or this instruction, always ask your healthcare professional. [address] [address] any warranty or liability for your use of this information. Discharge Orders None ACO Transitions of Care Introducing [employer] offers a voluntary care coordination program to provide high quality service and care to [facility] fee-for-service beneficiaries. Hoda Thrasher was designed to help you enhance your health and well-being through the following services: ? Transitions of Care  support for individuals who are transitioning from one care setting to another (example: Hospital to home). ? Chronic and Complex Care Coordination  support for individuals and caregivers of those with serious or chronic illnesses or with more than one chronic (ongoing) condition and those who take a number of different medications.   
 
 
If you meet specific medical criteria, a [facility] [address] may call you directly to coordinate your care with your primary care physician and your other care providers. For questions about the [facility] programs, please, contact your physicians office. For general questions or additional information about Accountable Care Organizations: 
Please visit www.medicare.gov/acos. html or call 1-800-MEDICARE (8-438.306.6226) [employer] users should call 7-511.916.3418. General Information Please provide this summary of care documentation to your next provider. Patient Signature:  ____________________________________________________________ Date:  ____________________________________________________________  
  
Padmaja Fisher Provider Signature:  ____________________________________________________________ Date:  ____________________________________________________________

## 2017-03-23 NOTE — IP AVS SNAPSHOT
Current Discharge Medication List  
  
CONTINUE these medications which have NOT CHANGED Dose & Instructions Dispensing Information Comments Morning Noon Evening Bedtime  
 allopurinol 100 mg tablet Commonly known as:  Ramu Zhour Your last dose was: Your next dose is:    
   
   
 Dose:  100 mg Take 100 mg by mouth Three (3) times a week. Mondays Wednesday Fridays Refills:  0  
     
   
   
   
  
 amLODIPine 10 mg tablet Commonly known as:  Jeana Blade Your last dose was: Your next dose is:    
   
   
 Dose:  10 mg Take 1 Tab by mouth daily. Indications: hypertension Quantity:  90 Tab Refills:  1  
     
   
   
   
  
 atorvastatin 80 mg tablet Commonly known as:  LIPITOR Your last dose was: Your next dose is:    
   
   
 Dose:  80 mg Take 80 mg by mouth nightly. Refills:  0  
     
   
   
   
  
 furosemide 80 mg tablet Commonly known as:  LASIX Your last dose was: Your next dose is: TAKE 1 TABLET BY MOUTH DAILY Quantity:  30 Tab Refills:  3  
     
   
   
   
  
 metoprolol succinate 25 mg XL tablet Commonly known as:  TOPROL-XL Your last dose was: Your next dose is: TAKE 1 TABLET BY MOUTH DAILY Quantity:  30 Tab Refills:  6 RENVELA 800 mg Tab tab Generic drug:  sevelamer carbonate Your last dose was: Your next dose is:    
   
   
 Dose:  1600 mg Take 1,600 mg by mouth three (3) times daily. Refills:  0  
     
   
   
   
  
 tamsulosin 0.4 mg capsule Commonly known as:  FLOMAX Your last dose was: Your next dose is: TAKE ONE CAPSULE BY MOUTH EVERY DAY Quantity:  90 Cap Refills:  0

## 2017-03-24 NOTE — OP NOTES
[address]    Name:  Anyi Kumar  MR#:  [id number]  :  1935  Account #:  [de-identified]  Date of Adm:  2017  Date of Surgery:  2017      ATTENDING PHYSICIAN: Tin Douglas MD    PREOPERATIVE DIAGNOSIS: End-stage renal disease with a shunt  that is poorly working at dialysis. POSTOPERATIVE DIAGNOSIS: End-stage renal disease with a  shunt that is poorly working at dialysis. PROCEDURES PERFORMED  1. Left upper extremity shuntogram.  2. Balloon angioplasty of the brachial vein. 3. Transluminal intravascular placement of stent at the venous  anastomosis of the graft into the brachial vein. 4. Moderate conscious sedation of 20 minutes. 5. Ultrasound-guided access of AV graft. CULTURES: None. SPECIMENS REMOVED: None. DRAINS: None. ESTIMATED BLOOD LOSS: Less than 50 mL. ANESTHESIA: Moderate conscious sedation of 28 minutes. This was  provided by an independent provider giving the medications per my  discretion and monitoring of vital signs throughout the case. INDICATIONS FOR PROCEDURE: The patient is an 81-year-old  gentleman with end-stage renal disease with poorly working left upper  extremity AV graft. The patient was given risks and benefits of the  procedure include limited to bleeding, infection, damage to adjacent  structures number, stroke, and death. The patient was understanding  of all these risks and underwent the procedure. OPERATIVE FINDINGS: The AV shunt is patent without any evidence  of stenosis within the actual shunt piece. The arterial anastomosis is  patent without any stenosis. The patient does have a normal taper of  this, the proximal and distal brachial artery are both patent without  stenosis. The venous anastomosis shows a near total occlusion, 99%  narrowing. The brachial vein is duplicated.  There is a small brachial  vein where the graft was actually put onto and then the subclavian  vein, axillary vein and brachiocephalic vein are all patent without  stenosis and the SVC is patent without stenosis. DESCRIPTION OF PROCEDURE: The patient was correctly identified  in the precath area and taken to the catheterization lab in stable  condition. The patient had a pre-incision time out prior to any incision. The patient was prepped and draped in normal sterile fashion  according to [facility] guidelines aseptic technique. We then were able to  feel the arterial inflow. We used an ultrasound to visualize the shunt,  took a picture of this, and kept it with the patient's chart. Once we were  able to do this, we numbed him up appropriately using 1% lidocaine,  took a single entry needle and gained access into the graft. Once the  needle tip was identified within the graft and there is positive blood  flow, a wire was then placed. A small skin incision was created using  11 blade and a 6-Indian short sheath was then placed. We then were  able to shoot a shuntogram. This identified all the above findings. We  then placed a Bentson wire in and were able to get this across. We  then were able to balloon it with an 8 x 6 Conquest balloon. There was  still some 50% residual narrowing at the venous anastomosis, but the  rest of the brachial vein was patent without any issues, so we went  ahead and put a V-18 wire in and placed an 8 x 25 Viabahn stent,  post-dilated with an 8 x 40 balloon after we had upsized to a 7-Indian  sheath and heparinized the patient appropriately. The patient on a  repeat shuntogram showed complete resolution of all of the narrowings  and good flow throughout the shunt. A decision was then made to  conclude the case. All wires, catheters, and sheaths were removed. The sheath insertion site was closed using a 3-0 nylon suture in a Z-  stitch formation.         MD REED Coelho / Linda Khan  D:  03/23/2017   16:31  T:  03/23/2017   22:23  Job #:  [id number]

## 2017-03-27 RX ORDER — LISINOPRIL 10 MG/1
10 TABLET ORAL DAILY
Qty: 30 TAB | Refills: 3 | Status: SHIPPED | OUTPATIENT
Start: 2017-03-27 | End: 2017-04-11 | Stop reason: SDUPTHER

## 2017-04-06 ENCOUNTER — OFFICE VISIT (OUTPATIENT)
Dept: VASCULAR SURGERY | Age: 82
End: 2017-04-06

## 2017-04-06 VITALS
WEIGHT: 175 LBS | SYSTOLIC BLOOD PRESSURE: 138 MMHG | BODY MASS INDEX: 28.12 KG/M2 | HEIGHT: 66 IN | DIASTOLIC BLOOD PRESSURE: 58 MMHG | RESPIRATION RATE: 13 BRPM | HEART RATE: 61 BPM

## 2017-04-06 DIAGNOSIS — N18.6 ESRD (END STAGE RENAL DISEASE) ON DIALYSIS (HCC): Primary | ICD-10-CM

## 2017-04-06 DIAGNOSIS — Z99.2 ESRD (END STAGE RENAL DISEASE) ON DIALYSIS (HCC): Primary | ICD-10-CM

## 2017-04-06 NOTE — MR AVS SNAPSHOT
Visit Information Date & Time Provider Department Dept. Phone Encounter #  
 4/6/2017 10:15  [address], [address] Vein/Vascular 1632 [facility] [id number] Your Appointments 8/17/2017  8:15 AM  
ESTABLISHED PATIENT with Wang Melchor MD  
[facility]) Appt Note: 6 months [address] [facility], [address] [facility] [address]  
[address], 6[address] [facility] [address] Lu[address] Maria Tetlin Upcoming Health Maintenance Date Due  
 FOOT EXAM Q1 12/27/1945 MICROALBUMIN Q1 12/27/1945 EYE EXAM RETINAL OR DILATED Q1 12/27/1946 DTaP/Tdap/Td series (1 - Tdap) 12/27/1957 ZOSTER VACCINE AGE 60> 12/27/1996 GLAUCOMA SCREENING Q2Y 12/27/2001 Pneumococcal 65+ High/Highest Risk (1 of 2 - PCV13) 12/27/2001 MEDICARE YEARLY EXAM 12/27/2001 INFLUENZA AGE 9 TO ADULT 8/1/2016 HEMOGLOBIN A1C Q6M 2/8/2017 LIPID PANEL Q1 8/8/2017 Allergies as of 4/6/2017  Review Complete On: 4/6/2017 By: Abbott Distance Severity Noted Reaction Type Reactions Other Medication  05/09/2013    Unknown (comments) ANESTHESIA Current Immunizations  Never Reviewed No immunizations on file. Not reviewed this visit Vitals BP Pulse Resp Height(growth percentile) Weight(growth percentile) BMI  
 138/58 (BP 1 Location: Right arm, BP Patient Position: Sitting) 61 13 5' 6\" (1.676 m) 175 lb (79.4 kg) 28.25 kg/m2 Smoking Status Former Smoker Vitals History BMI and BSA Data Body Mass Index Body Surface Area  
 28.25 kg/m 2 1.92 m 2 Preferred Pharmacy Pharmacy Name Phone [facility]ORE 5 [facility] Connor Denton [address] [place] [address] [phone] Your Updated Medication List  
  
   
This list is accurate as of: 4/6/17 10:20 AM.  Always use your most recent med list.  
  
  
  
  
 allopurinol 100 mg tablet Commonly known as:  Oswaldo Lawrenceer Take 100 mg by mouth Three (3) times a week. Mondays Wednesday Fridays  
  
 amLODIPine 10 mg tablet Commonly known as:  Opal Diane Take 1 Tab by mouth daily. Indications: hypertension  
  
 atorvastatin 80 mg tablet Commonly known as:  LIPITOR Take 80 mg by mouth nightly. furosemide 80 mg tablet Commonly known as:  LASIX TAKE 1 TABLET BY MOUTH DAILY  
  
 lisinopril 10 mg tablet Commonly known as:  Kateryna Feil Take 1 Tab by mouth daily. metoprolol succinate 25 mg XL tablet Commonly known as:  TOPROL-XL  
TAKE 1 TABLET BY MOUTH DAILY RENVELA 800 mg Tab tab Generic drug:  sevelamer carbonate Take 1,600 mg by mouth three (3) times daily. tamsulosin 0.4 mg capsule Commonly known as:  FLOMAX TAKE 1 CAPSULE BY MOUTH EVERY DAY Please provide this summary of care documentation to your next provider. Your primary care clinician is listed as Shyann Figueredo. If you have any questions after today's visit, please call [phone].

## 2017-04-06 NOTE — PROGRESS NOTES
History of Present Illness: Patient is here today in follow-up after a left upper extremity shuntogram with balloon angioplasty of the brachial vein. Transluminal intravascular placement of stent at the venous anastomosis of the graft into the brachial vein. Now working well with no problems. He is doing well without complaints. He does report that he is moving to the [facility] at the end of the month and will need to establish new vascular care. Physical Examination: The AVG is palpable with a good thrill and good bruit. The incision looks well healed and sutures removed today in the office. Assessment/Plan:   AVG patent with good thrill and bruit   F/u as needed   Discussed that we will be happy to forward his medical records once he establishes new vascular care. Patient states he will call the office when this happens. Plan was discussed. Patient expresses understanding and agrees.     [address] [place] [address]

## 2017-04-10 NOTE — TELEPHONE ENCOUNTER
Per Dr Gideon Arteaga after reviewing BP log from 3/19 to 4/5/17:  Increase Lisinopril to 20 mg daily   BP chart x 1 week    Left message for patient to call the office.

## 2017-04-11 RX ORDER — LISINOPRIL 20 MG/1
20 TABLET ORAL DAILY
Qty: 30 TAB | Refills: 1 | Status: SHIPPED | OUTPATIENT
Start: 2017-04-11 | End: 2017-05-16 | Stop reason: SDUPTHER

## 2017-04-14 RX ORDER — METOPROLOL SUCCINATE 25 MG/1
TABLET, EXTENDED RELEASE ORAL
Qty: 30 TAB | Refills: 3 | Status: SHIPPED | OUTPATIENT
Start: 2017-04-14 | End: 2017-05-16 | Stop reason: SDUPTHER

## 2017-05-09 ENCOUNTER — OFFICE VISIT (OUTPATIENT)
Dept: CARDIOLOGY CLINIC | Age: 82
End: 2017-05-09

## 2017-05-09 VITALS
BODY MASS INDEX: 26.87 KG/M2 | WEIGHT: 167.2 LBS | HEART RATE: 65 BPM | HEIGHT: 66 IN | OXYGEN SATURATION: 98 % | SYSTOLIC BLOOD PRESSURE: 124 MMHG | DIASTOLIC BLOOD PRESSURE: 42 MMHG

## 2017-05-09 DIAGNOSIS — R73.03 PRE-DIABETES: ICD-10-CM

## 2017-05-09 DIAGNOSIS — I25.10 ATHEROSCLEROSIS OF NATIVE CORONARY ARTERY OF NATIVE HEART WITHOUT ANGINA PECTORIS: Primary | ICD-10-CM

## 2017-05-09 DIAGNOSIS — E78.1 PURE HYPERGLYCERIDEMIA: ICD-10-CM

## 2017-05-09 DIAGNOSIS — I10 ESSENTIAL HYPERTENSION: ICD-10-CM

## 2017-05-09 DIAGNOSIS — N18.6 ESRD (END STAGE RENAL DISEASE) (HCC): ICD-10-CM

## 2017-05-09 NOTE — PROGRESS NOTES
HISTORY OF PRESENT ILLNESS  Rafia Lopez is a [de-identified] y.o. male     SUMMARY:   Problem List  Date Reviewed: 5/9/2017          Codes Class Noted    Bradycardia ICD-10-CM: R00.1  ICD-9-CM: 427.89  12/21/2015    Overview Addendum 1/7/2016 10:55 AM by Yamilka Malave MD     12/15 lopressor d/neela by Dr Terry Ashley, was taking 100 mg  will start 25 mg and watch for tamara  1/16 tolerating toprol xl 25/day             ESRD (end stage renal disease) ([facility] [address] ICD-10-CM: N18.6  ICD-9-CM: 585.6  12/14/2015    Overview Signed 2/2/2017  8:55 AM by Yamilka Malave MD     HD since 9/16 approx             Undiagnosed cardiac murmurs ICD-10-CM: R01.1  ICD-9-CM: 785.2  5/14/2014    Overview Signed 5/14/2014  3:48 PM by Yamilka Malave MD     r/o AS             Coronary atherosclerosis of native coronary artery ICD-10-CM: I25.10  ICD-9-CM: 414.01  Unknown    Overview Addendum 5/9/2017  2:06 PM by Agusto Lyn MD     Stable symptoms  1991 single vessel cabg after failed pci, details unknown             HLD (hyperlipidemia) ICD-10-CM: E78.5  ICD-9-CM: 272.4  Unknown    Overview Addendum 2/2/2017  8:46 AM by Yamilka Malave MD     8/16 Ferraro Manna; 5/16 LDL32; 12/15 LDL21; 6/15  Low density lipoproteins (LDLs) are at goal, triglycerides are at goal, high density lipoproteins (HDLs) are at goal             Postsurgical aortocoronary bypass status ICD-10-CM: Z95.1  ICD-9-CM: V45.81  Unknown        Essential hypertension ICD-10-CM: I10  ICD-9-CM: 401.9  Unknown    Overview Addendum 2/2/2017  8:52 AM by Yamilka Malave MD     2/17 incr norvasc to 10;   F/U Dr. Terry Ashley             Mitral regurgitation ICD-10-CM: I34.0  ICD-9-CM: 424.0  Unknown    Overview Addendum 5/9/2017  7:15 AM by Agusto Lyn MD     2/10 trace MR;   5/14 echo normal lvef, mild MR and tr without pul htn.  Aortic root 3.8cm             BPH (benign prostatic hyperplasia) ICD-10-CM: N40.0  ICD-9-CM: 600.00  3/14/2013        Lower urinary tract symptoms (LUTS) ICD-10-CM: R39.9  ICD-9-CM: 788.99  3/14/2013        Elevated prostate specific antigen (PSA) ICD-10-CM: R97.20  ICD-9-CM: 790.93  3/14/2013        Renal insufficiency ICD-10-CM: N28.9  ICD-9-CM: 593.9  3/14/2013              Current Outpatient Prescriptions on File Prior to Visit   Medication Sig    metoprolol succinate (TOPROL-XL) 25 mg XL tablet TAKE 1 TABLET BY MOUTH DAILY    lisinopril (PRINIVIL, ZESTRIL) 20 mg tablet Take 1 Tab by mouth daily.  tamsulosin (FLOMAX) 0.4 mg capsule TAKE 1 CAPSULE BY MOUTH EVERY DAY    amLODIPine (NORVASC) 10 mg tablet Take 1 Tab by mouth daily. Indications: hypertension    sevelamer carbonate (RENVELA) 800 mg tab tab Take 1,600 mg by mouth three (3) times daily.  allopurinol (ZYLOPRIM) 100 mg tablet Take 100 mg by mouth Three (3) times a week. Mondays Wednesday Fridays    atorvastatin (LIPITOR) 80 mg tablet Take 80 mg by mouth nightly.  furosemide (LASIX) 80 mg tablet TAKE 1 TABLET BY MOUTH DAILY     No current facility-administered medications on file prior to visit. CARDIOLOGY STUDIES TO DATE:  5/14 echo normal lvef, mild MR and tr without pul htn. Aortic root 3.8cm  2010 normal lexiscan cardiolyte    Chief Complaint   Patient presents with    Coronary Artery Disease     HPI :  Mr. Selin Parham presents as a new patient having relocated from the [facility]. It sounds like he had a single vessel CABG many years ago after a failed PCI and has done remarkably well. He is fairly active in spite of being a dialysis patient for the last year and part of relocating was working in his yard and cutting grass without any worrisome symptoms. He has hypertension and hyperlipidemia and pre-diabetes. He is a past smoker, and family history is negative for premature coronary disease. His last EKG was in February. It showed normal sinus rhythm with first-degree AV block and minimal nonspecific ST-T wave changes.  Last lipid profile was in August of last year, and it looked excellent. He has some mild dependent lower extremity edema.      CARDIAC ROS:   negative for chest pain, dyspnea, palpitations, syncope, orthopnea, paroxysmal nocturnal dyspnea, exertional chest pressure/discomfort, claudication    Family History   Problem Relation Age of Onset    Cancer Mother     Heart Disease Neg Hx     Heart Attack Neg Hx     Sudden Death Neg Hx     Stroke Neg Hx        Past Medical History:   Diagnosis Date    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 Rosana Zacarias Bradycardia 12/21/2015    12/15 lopressor d/neela by Dr Renea Daigle, was taking 100 mg will start 25 mg and watch for tamara     CAD (coronary artery disease)     Chronic kidney disease     Coronary atherosclerosis of native coronary artery     Stable symptoms    Essential hypertension, benign     Controlled    Hypercholesteremia     Mitral valve disorders     Nausea & vomiting     Other and unspecified hyperlipidemia     3/12 Low density lipoproteins (LDLs) are at goal, triglycerides are at goal, high density lipoproteins (HDLs) are at goal    Postsurgical aortocoronary bypass status 1991    Prostate disease        GENERAL ROS:  A comprehensive review of systems was negative except for: Gastrointestinal: positive for nausea  Musculoskeletal: positive for arthralgias  Behvioral/Psych: positive for sleep disturbance    Visit Vitals    /42 (BP 1 Location: Right arm, BP Patient Position: Sitting)    Pulse 65    Ht 5' 6\" (1.676 m)    Wt 167 lb 3.2 oz (75.8 kg)    SpO2 98%    BMI 26.99 kg/m2       Wt Readings from Last 3 Encounters:   05/09/17 167 lb 3.2 oz (75.8 kg)   04/06/17 175 lb (79.4 kg)   03/23/17 175 lb (79.4 kg)            BP Readings from Last 3 Encounters:   05/09/17 124/42   04/06/17 138/58   03/23/17 164/55       PHYSICAL EXAM  General appearance: alert, cooperative, no distress, appears stated age  Neurologic: Alert and oriented X 3  Neck: supple, symmetrical, trachea midline, no adenopathy, no carotid bruit and no JVD  Lungs: clear to auscultation bilaterally  Heart: regular rate and rhythm, S1, S2 normal, no S3 or S4, systolic murmur: holosystolic 1/6, crescendo at apex  Abdomen: soft, non-tender. Bowel sounds normal. No masses,  no organomegaly  Extremities: extremities normal, atraumatic, no cyanosis or edema  Pulses: 2+ and symmetric    Lab Results   Component Value Date/Time    Cholesterol, total 112 08/08/2016 06:53 AM    Cholesterol, total 116 07/11/2016 07:18 AM    Cholesterol, total 116 05/31/2016 07:00 AM    Cholesterol, total 125 04/11/2016 08:02 AM    Cholesterol, total 115 03/07/2016 06:48 AM    HDL Cholesterol 62 08/08/2016 06:53 AM    HDL Cholesterol 63 07/11/2016 07:18 AM    HDL Cholesterol 69 05/31/2016 07:00 AM    HDL Cholesterol 58 04/11/2016 08:02 AM    HDL Cholesterol 57 03/07/2016 06:48 AM    LDL, calculated 35.2 08/08/2016 06:53 AM    LDL, calculated 35.4 07/11/2016 07:18 AM    LDL, calculated 32.4 05/31/2016 07:00 AM    LDL, calculated 40.2 04/11/2016 08:02 AM    LDL, calculated 39.6 03/07/2016 06:48 AM    Triglyceride 74 08/08/2016 06:53 AM    Triglyceride 88 07/11/2016 07:18 AM    Triglyceride 73 05/31/2016 07:00 AM    Triglyceride 134 04/11/2016 08:02 AM    Triglyceride 92 03/07/2016 06:48 AM    CHOL/HDL Ratio 1.8 08/08/2016 06:53 AM    CHOL/HDL Ratio 1.8 07/11/2016 07:18 AM    CHOL/HDL Ratio 1.7 05/31/2016 07:00 AM    CHOL/HDL Ratio 2.2 04/11/2016 08:02 AM    CHOL/HDL Ratio 2.0 03/07/2016 06:48 AM     ASSESSMENT  Mr. Bean Jorge has done really well and at this point appears to be well-compensated on a good medical regimen. He has some additional paperwork regarding his past history that he is going to bring to us the next time we meet. He is establishing with a PCP, and so we will defer any lab work to see if they get a lipid profile on him when they meet next month.  At some point we may want to repeat his echo to reevaluate his ascending aorta. current treatment plan is effective, no change in therapy  lab results and schedule of future lab studies reviewed with patient  reviewed diet, exercise and weight control    Encounter Diagnoses   Name Primary?  Atherosclerosis of native coronary artery of native heart without angina pectoris Yes    Pure hyperglyceridemia     Essential hypertension     ESRD (end stage renal disease) (HCC)     Pre-diabetes      No orders of the defined types were placed in this encounter. Follow-up Disposition:  Return in about 4 weeks (around 6/6/2017).     Garwin Lefort, MD  5/9/2017

## 2017-05-09 NOTE — MR AVS SNAPSHOT
Visit Information Date & Time Provider Department Dept. Phone Encounter #  
 5/9/2017  1:40 PM Ramona Iyer MD [facility] Justin Lopez [phone] [id number] Follow-up Instructions Return in about 4 weeks (around 6/6/2017). Upcoming Health Maintenance Date Due DTaP/Tdap/Td series (1 - Tdap) 12/27/1957 ZOSTER VACCINE AGE 60> 12/27/1996 GLAUCOMA SCREENING Q2Y 12/27/2001 Pneumococcal 65+ High/Highest Risk (1 of 2 - PCV13) 12/27/2001 MEDICARE YEARLY EXAM 12/27/2001 INFLUENZA AGE 9 TO ADULT 8/1/2017 Allergies as of 5/9/2017  Review Complete On: 5/9/2017 By: Ramona Iyer MD  
  
 Severity Noted Reaction Type Reactions Other Medication  05/09/2013    Unknown (comments) ANESTHESIA Current Immunizations  Never Reviewed No immunizations on file. Not reviewed this visit You Were Diagnosed With   
  
 Codes Comments Atherosclerosis of native coronary artery of native heart without angina pectoris    -  Primary ICD-10-CM: I25.10 ICD-9-CM: 414.01   
 Pure hyperglyceridemia     ICD-10-CM: E78.1 ICD-9-CM: 272.1 Essential hypertension     ICD-10-CM: I10 
ICD-9-CM: 401.9 [facility]     ICD-10-CM: N18.6 ICD-9-CM: 147. 6 Vitals BP Pulse Height(growth percentile) Weight(growth percentile) SpO2 BMI  
 124/42 (BP 1 Location: Right arm, BP Patient Position: Sitting) 65 5' 6\" (1.676 m) 167 lb 3.2 oz (75.8 kg) 98% 26.99 kg/m2 Smoking Status Former Smoker Vitals History BMI and BSA Data Body Mass Index Body Surface Area  
 26.99 kg/m 2 1.88 m 2 Preferred Pharmacy Pharmacy Name Phone [facility] DR[facility]ORE 1 [address]  [facility] [phone] Your Updated Medication List  
  
   
This list is accurate as of: 5/9/17  1:43 PM.  Always use your most recent med list.  
  
  
  
  
 allopurinol 100 mg tablet Commonly known as:  Cote Chimera Take 100 mg by mouth Three (3) times a week. Mondays Wednesday Fridays  
  
 amLODIPine 10 mg tablet Commonly known as:  Herlene Pupa Take 1 Tab by mouth daily. Indications: hypertension  
  
 atorvastatin 80 mg tablet Commonly known as:  LIPITOR Take 80 mg by mouth nightly. furosemide 80 mg tablet Commonly known as:  LASIX TAKE 1 TABLET BY MOUTH DAILY  
  
 lisinopril 20 mg tablet Commonly known as:  Walker Ku Take 1 Tab by mouth daily. metoprolol succinate 25 mg XL tablet Commonly known as:  TOPROL-XL  
TAKE 1 TABLET BY MOUTH DAILY RENVELA 800 mg Tab tab Generic drug:  sevelamer carbonate Take 1,600 mg by mouth three (3) times daily. tamsulosin 0.4 mg capsule Commonly known as:  FLOMAX TAKE 1 CAPSULE BY MOUTH EVERY DAY Follow-up Instructions Return in about 4 weeks (around 6/6/2017). Please provide this summary of care documentation to your next provider. Your primary care clinician is listed as Renetta Moore. If you have any questions after today's visit, please call [phone].

## 2017-06-21 RX ORDER — TAMSULOSIN HYDROCHLORIDE 0.4 MG/1
CAPSULE ORAL
Qty: 90 CAP | Refills: 0 | OUTPATIENT
Start: 2017-06-21

## 2017-06-22 ENCOUNTER — APPOINTMENT (OUTPATIENT)
Dept: CT IMAGING | Age: 82
End: 2017-06-22
Attending: EMERGENCY MEDICINE
Payer: MEDICARE

## 2017-06-22 ENCOUNTER — HOSPITAL ENCOUNTER (EMERGENCY)
Age: 82
Discharge: HOME | End: 2017-06-22
Attending: EMERGENCY MEDICINE
Payer: MEDICARE

## 2017-06-22 VITALS
HEIGHT: 66 IN | OXYGEN SATURATION: 100 % | HEART RATE: 65 BPM | WEIGHT: 162 LBS | BODY MASS INDEX: 26.03 KG/M2 | DIASTOLIC BLOOD PRESSURE: 45 MMHG | RESPIRATION RATE: 16 BRPM | TEMPERATURE: 97.9 F | SYSTOLIC BLOOD PRESSURE: 147 MMHG

## 2017-06-22 DIAGNOSIS — N18.9 CHRONIC RENAL FAILURE, UNSPECIFIED STAGE: ICD-10-CM

## 2017-06-22 DIAGNOSIS — R42 DIZZINESS: Primary | ICD-10-CM

## 2017-06-22 LAB
ANION GAP BLD CALC-SCNC: 17 MMOL/L (ref 5–15)
BUN BLD-MCNC: 43 MG/DL (ref 9–20)
CA-I BLD-MCNC: 1.1 MMOL/L (ref 1.12–1.32)
CHLORIDE BLD-SCNC: 102 MMOL/L (ref 98–107)
CO2 BLD-SCNC: 27 MMOL/L (ref 21–32)
CREAT BLD-MCNC: 6.2 MG/DL (ref 0.6–1.3)
GLUCOSE BLD-MCNC: 124 MG/DL (ref 65–100)
HCT VFR BLD CALC: 27 % (ref 36.6–50.3)
HGB BLD-MCNC: 9.2 GM/DL (ref 12.1–17)
POTASSIUM BLD-SCNC: 5 MMOL/L (ref 3.5–5.1)
SERVICE CMNT-IMP: ABNORMAL
SODIUM BLD-SCNC: 139 MMOL/L (ref 136–145)

## 2017-06-22 PROCEDURE — 80047 BASIC METABLC PNL IONIZED CA: CPT

## 2017-06-22 PROCEDURE — 99284 EMERGENCY DEPT VISIT MOD MDM: CPT

## 2017-06-22 PROCEDURE — 70450 CT HEAD/BRAIN W/O DYE: CPT

## 2017-06-22 RX ORDER — MECLIZINE HYDROCHLORIDE 25 MG/1
25 TABLET ORAL
Qty: 30 TAB | Refills: 0 | Status: SHIPPED | OUTPATIENT
Start: 2017-06-22 | End: 2017-07-02

## 2017-06-22 RX ORDER — TAMSULOSIN HYDROCHLORIDE 0.4 MG/1
CAPSULE ORAL
Qty: 90 CAP | Refills: 0 | OUTPATIENT
Start: 2017-06-22

## 2017-06-22 NOTE — ED PROVIDER NOTES
HPI Comments: [de-identified] y.o. male with past medical history significant for CAD, HTN, CKD, ESRD, coronary artherosclerosis, and heart failure who presents with chief complaint of dizziness. Patient complains of intermittent episodes of dizziness, which usually presents immediately upon standing. Patient notes that dizziness eventually subsides after \"standing for a while. \"  Patient describes dizziness as \"feeling off balanced,\" which resolves when lying still and increases with positional changes. Patient additionally mentions intermittent tinnitus in the left ear for the past 1-2 weeks -- \"feels like there's a bubble in my ear. \"  Per family, patient has recently become increasingly fatigued, lethargic, and generally weak -- \"he's not as upbeat as he usually is. \"  Patient also reports decreased appetite. Patient denies any recent falls. Patient denies any chest pain, SOB, hematochezia, hematuria, HA, weakness, or numbness. Per daughter, patient has recently moved to [place], and recently saw Dr. Preethi Morin. Per daughter, patient has an upcoming appointment scheduled with Dr. Juanito Bustos. There are no other acute medical concerns at this time. Social hx: former tobacco smoker; denies EtOH   PCP: Joann Abdullahi MD  Cardiology: Celestine Madden MD    Dialysis:  Lior Santiago is currently a hemodialysis patient. Is He a new dialysis patient (< 6 months)?  no  Current schedule is Monday/Wednesday/Friday   Last full session was 1 day ago. Note written by Christopher Brown, as dictated by Giovanni Santiago MD 4:09 PM    The history is provided by the patient and a relative. No  was used.         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 Estelita Spain Bradycardia 12/21/2015    12/15 lopressor d/neela by Dr Gabe Lopez, was taking 100 mg will start 25 mg and watch for tamara     CAD (coronary artery disease)     Chronic kidney disease     Coronary atherosclerosis of native coronary artery     Stable symptoms    Essential hypertension, benign     Controlled    Hypercholesteremia     Mitral valve disorders     Nausea & vomiting     Other and unspecified hyperlipidemia     3/12 Low density lipoproteins (LDLs) are at goal, triglycerides are at goal, high density lipoproteins (HDLs) are at goal    Postsurgical aortocoronary bypass status 1991    Prostate disease        Past Surgical History:   Procedure Laterality Date    CARDIAC SURG PROCEDURE UNLIST      HX CORONARY ARTERY BYPASS GRAFT  1991    x 1         Family History:   Problem Relation Age of Onset    Cancer Mother     Heart Disease Neg Hx     Heart Attack Neg Hx     Sudden Death Neg Hx     Stroke Neg Hx        Social History     Social History    Marital status:      Spouse name: N/A    Number of children: N/A    Years of education: N/A     Occupational History    Not on file. Social History Main Topics    Smoking status: Former Smoker     Quit date: 11/1/1983    Smokeless tobacco: Never Used    Alcohol use No    Drug use: No    Sexual activity: Not on file     Other Topics Concern    Not on file     Social History Narrative         ALLERGIES: Other medication    Review of Systems   Constitutional: Positive for appetite change and fatigue. Negative for fever and unexpected weight change. HENT: Positive for tinnitus. Negative for ear pain, hearing loss, nosebleeds, rhinorrhea, sore throat and trouble swallowing. Respiratory: Negative. Negative for cough, chest tightness and shortness of breath. Cardiovascular: Negative. Negative for chest pain and palpitations. Gastrointestinal: Negative. Negative for abdominal distention, abdominal pain, blood in stool and vomiting. Endocrine: Negative. Genitourinary: Negative for dysuria and hematuria. Musculoskeletal: Negative. Negative for back pain and myalgias. Skin: Negative. Negative for rash. Allergic/Immunologic: Negative. Neurological: Positive for dizziness and weakness. Negative for syncope, numbness and headaches. Hematological: Negative. Psychiatric/Behavioral: Negative. All other systems reviewed and are negative. Vitals:    06/22/17 1604 06/22/17 1730   BP: 161/72 147/45   Pulse: 65    Resp: 16    Temp: 97.9 °F (36.6 °C)    SpO2: 97% 100%   Weight: 73.5 kg (162 lb)    Height: 5' 6\" (1.676 m)             Physical Exam   Constitutional: He is oriented to person, place, and time. He appears well-developed and well-nourished. No distress. HENT:   Head: Normocephalic and atraumatic. Right Ear: External ear normal.   Left Ear: External ear normal.   Nose: Nose normal.   Mouth/Throat: Oropharynx is clear and moist.   Eyes: Conjunctivae and EOM are normal. Pupils are equal, round, and reactive to light. Right eye exhibits no nystagmus. Left eye exhibits no nystagmus. Neck: Normal range of motion. Neck supple. No JVD present. No thyromegaly present. Cardiovascular: Normal rate, regular rhythm, normal heart sounds and intact distal pulses. No murmur heard. Pulmonary/Chest: Effort normal and breath sounds normal. No respiratory distress. He has no wheezes. He has no rales. Abdominal: Soft. Bowel sounds are normal. He exhibits no distension. There is no tenderness. Musculoskeletal: Normal range of motion. He exhibits no edema. Neurological: He is alert and oriented to person, place, and time. No cranial nerve deficit. No ataxia. No focal neurological deficits. Skin: Skin is warm and dry. No rash noted. Psychiatric: He has a normal mood and affect. His behavior is normal. Thought content normal.   Nursing note and vitals reviewed. Note written by Pilar Najjar, Scribe, as dictated by Sisi Isabel MD 4:09 PM    [facility]  ED Course       Procedures      5:28 PM  Head CT negative.   Chem8 consistent with chronic renal failure. Creatinine 6.2, BUN 43, potassium 5.0, and HGB 9.2. Assessment & Plan: dizziness with positional changes, questionable vertigo. No orthostasis. Will discharge home with trial of Meclizine along with PCP f/u.

## 2017-06-22 NOTE — ED TRIAGE NOTES
Patient c/o dizziness when he stands up and ringing in his left ear, also c/o fatigue . Patient is a HD patient MWF And received his whole treatment yesterday.

## 2017-06-22 NOTE — ED NOTES
Pt has been taken to CT scan, returned to room, placed on BP and pulse ox monitor. Family remains at bedside.

## 2017-06-22 NOTE — DISCHARGE INSTRUCTIONS
Dizziness: Care Instructions  Your Care Instructions  Dizziness is the feeling of unsteadiness or fuzziness in your head. It is different than having vertigo, which is a feeling that the room is spinning or that you are moving or falling. It is also different from lightheadedness, which is the feeling that you are about to faint. It can be hard to know what causes dizziness. Some people feel dizzy when they have migraine headaches. Sometimes bouts of flu can make you feel dizzy. Some medical conditions, such as heart problems or high blood pressure, can make you feel dizzy. Many medicines can cause dizziness, including medicines for high blood pressure, pain, or anxiety. If a medicine causes your symptoms, your doctor may recommend that you stop or change the medicine. If it is a problem with your heart, you may need medicine to help your heart work better. If there is no clear reason for your symptoms, your doctor may suggest watching and waiting for a while to see if the dizziness goes away on its own. Follow-up care is a key part of your treatment and safety. Be sure to make and go to all appointments, and call your doctor if you are having problems. It's also a good idea to know your test results and keep a list of the medicines you take. How can you care for yourself at home? · If your doctor recommends or prescribes medicine, take it exactly as directed. Call your doctor if you think you are having a problem with your medicine. · Do not drive while you feel dizzy. · Try to prevent falls. Steps you can take include:  ¨ Using nonskid mats, adding grab bars near the tub, and using night-lights. ¨ Clearing your home so that walkways are free of anything you might trip on. ¨ Letting family and friends know that you have been feeling dizzy. This will help them know how to help you. When should you call for help? Call 911 anytime you think you may need emergency care.  For example, call if:  · You passed out (lost consciousness). · You have dizziness along with symptoms of a heart attack. These may include:  ¨ Chest pain or pressure, or a strange feeling in the chest.  ¨ Sweating. ¨ Shortness of breath. ¨ Nausea or vomiting. ¨ Pain, pressure, or a strange feeling in the back, neck, jaw, or upper belly or in one or both shoulders or arms. ¨ Lightheadedness or sudden weakness. ¨ A fast or irregular heartbeat. · You have symptoms of a stroke. These may include:  ¨ Sudden numbness, tingling, weakness, or loss of movement in your face, arm, or leg, especially on only one side of your body. ¨ Sudden vision changes. ¨ Sudden trouble speaking. ¨ Sudden confusion or trouble understanding simple statements. ¨ Sudden problems with walking or balance. ¨ A sudden, severe headache that is different from past headaches. Call your doctor now or seek immediate medical care if:  · You feel dizzy and have a fever, headache, or ringing in your ears. · You have new or increased nausea and vomiting. · Your dizziness does not go away or comes back. Watch closely for changes in your health, and be sure to contact your doctor if:  · You do not get better as expected. Where can you learn more? Go to http://gustavo-eb.info/. Enter W932 in the search box to learn more about \"Dizziness: Care Instructions. \"  Current as of: March 20, 2017  Content Version: 11.3  © 0428-5809 [employer]. Care instructions adapted under license by [employer] (which disclaims liability or warranty for this information). If you have questions about a medical condition or this instruction, always ask your healthcare professional. [place] [address] any warranty or liability for your use of this information. We hope that we have addressed all of your medical concerns.  The examination and treatment you received in the Emergency Department were for an emergent problem and were not intended as complete care. It is important that you follow up with your healthcare provider(s) for ongoing care. If your symptoms worsen or do not improve as expected, and you are unable to reach your usual health care provider(s), you should return to the Emergency Department. Today's healthcare is undergoing tremendous change, and patient satisfaction surveys are one of the many tools to assess the quality of medical care. You may receive a survey from the [employer] organization regarding your experience in the Emergency Department. I hope that your experience has been completely positive, particularly the medical care that I provided. As such, please participate in the survey; anything less than excellent does not meet my expectations or intentions. [facility] and [facility] participate in nationally recognized quality of care measures. If your blood pressure is greater than 120/80, as reported below, we urge that you seek medical care to address the potential of high blood pressure, commonly known as hypertension. Hypertension can be hereditary or can be caused by certain medical conditions, pain, stress, or \"white coat syndrome. \"       Please make an appointment with your health care provider(s) for follow up of your Emergency Department visit. VITALS:   Patient Vitals for the past 8 hrs:   Temp Pulse Resp BP SpO2   06/22/17 1604 97.9 °F (36.6 °C) 65 16 161/72 97 %          Thank you for allowing us to provide you with medical care today. We realize that you have many choices for your emergency care needs. Please choose us in the future for any continued health care needs. Dianne,           Willie Gilman Rothsay, [address] [place] [address].   Office: [phone]            Recent Results (from the past 24 hour(s))   POC CHEM8    Collection Time: 06/22/17  4:35 PM   Result Value Ref Range    Calcium, ionized (POC) 1.10 (L) 1.12 - 1.32 MMOL/L    Sodium (POC) 139 136 - 145 MMOL/L    Potassium (POC) 5.0 3.5 - 5.1 MMOL/L    Chloride (POC) 102 98 - 107 MMOL/L    CO2 (POC) 27 21 - 32 MMOL/L    Anion gap (POC) 17 (H) 5 - 15 mmol/L    Glucose (POC) 124 (H) 65 - 100 MG/DL    BUN (POC) 43 (H) 9 - 20 MG/DL    Creatinine (POC) 6.2 (H) 0.6 - 1.3 MG/DL    GFRAA, POC 11 (L) >60 ml/min/1.73m2    GFRNA, POC 9 (L) >60 ml/min/1.73m2    Hemoglobin (POC) 9.2 (L) 12.1 - 17.0 GM/DL    Hematocrit (POC) 27 (L) 36.6 - 50.3 %    Comment Comment Not Indicated. Ct Head Wo Cont    Result Date: 6/22/2017  INDICATION:   vertigo EXAM:  HEAD CT WITHOUT CONTRAST COMPARISON:  None TECHNIQUE:  Routine noncontrast axial head CT was performed. Sagittal and coronal reconstructions were generated. CT dose reduction was achieved through use of a standardized protocol tailored for this examination and automatic exposure control for dose modulation. FINDINGS: Ventricles:  Midline, no hydrocephalus. Intracranial Hemorrhage:  None. Brain Parenchyma/Brainstem:  Patchy periventricular white matter hypodensities suggest chronic small vessel ischemic change. Basal Cisterns:  Normal. Paranasal Sinuses:  Visualized sinuses are clear. Additional Comments:  N/A. IMPRESSION: No acute process.

## 2017-06-22 NOTE — ED NOTES
Pt discharged by provider. Pt escorted off the unit with a w/c and in NAD. Home with family who will be driving.

## 2017-08-10 ENCOUNTER — OFFICE VISIT (OUTPATIENT)
Dept: CARDIOLOGY CLINIC | Age: 82
End: 2017-08-10

## 2017-08-10 VITALS
SYSTOLIC BLOOD PRESSURE: 140 MMHG | HEART RATE: 60 BPM | DIASTOLIC BLOOD PRESSURE: 60 MMHG | HEIGHT: 66 IN | BODY MASS INDEX: 28.03 KG/M2 | WEIGHT: 174.4 LBS

## 2017-08-10 DIAGNOSIS — I25.10 ATHEROSCLEROSIS OF NATIVE CORONARY ARTERY OF NATIVE HEART WITHOUT ANGINA PECTORIS: Primary | ICD-10-CM

## 2017-08-10 DIAGNOSIS — R00.1 BRADYCARDIA: ICD-10-CM

## 2017-08-10 DIAGNOSIS — I10 ESSENTIAL HYPERTENSION: ICD-10-CM

## 2017-08-10 DIAGNOSIS — N18.6 ESRD (END STAGE RENAL DISEASE) (HCC): ICD-10-CM

## 2017-08-10 DIAGNOSIS — E78.2 MIXED HYPERLIPIDEMIA: ICD-10-CM

## 2017-08-10 NOTE — PROGRESS NOTES
Mr. Tana Norman is doing pretty well. He is walking some, though not as much as he should, and having no problems with his medications and no worrisome cardiac symptoms. His dialysis fistula apparently clotted off, and he had to have it worked on the other day, and his daughter tells me they put a stent in it. He brought in some blood work from a couple of days ago. His hemoglobin A1c was 5.2, total cholesterol was 142, triglycerides were 97, HDL was 59 and LDL was 63.

## 2017-08-10 NOTE — MR AVS SNAPSHOT
Visit Information Date & Time Provider Department Dept. Phone Encounter #  
 8/10/2017  2:20 PM Luis Carlos Ramos MD [facility] Lee [phone] [id number] Follow-up Instructions Return in about 6 months (around 2/10/2018). Your Appointments 8/10/2017  2:20 PM  
ESTABLISHED PATIENT with Luis Carlos Ramos MD  
[facility] ([place]) Appt Note: 3 month follow up  
 [phone]  
[address] [facility] [id number] Upcoming Health Maintenance Date Due DTaP/Tdap/Td series (1 - Tdap) 12/27/1956 ZOSTER VACCINE AGE 60> 10/27/1996 GLAUCOMA SCREENING Q2Y 12/27/2001 Pneumococcal 65+ High/Highest Risk (1 of 2 - PCV13) 12/27/2001 MEDICARE YEARLY EXAM 12/27/2001 INFLUENZA AGE 9 TO ADULT 8/1/2017 Allergies as of 8/10/2017  Review Complete On: 8/10/2017 By: Luis Carlos Ramos MD  
  
 Severity Noted Reaction Type Reactions Other Medication  05/09/2013    Unknown (comments) ANESTHESIA Current Immunizations  Never Reviewed No immunizations on file. Not reviewed this visit You Were Diagnosed With   
  
 Codes Comments Atherosclerosis of native coronary artery of native heart without angina pectoris    -  Primary ICD-10-CM: I25.10 ICD-9-CM: 414.01 Essential hypertension     ICD-10-CM: I10 
ICD-9-CM: 401.9 Bradycardia     ICD-10-CM: R00.1 ICD-9-CM: 427.89 ESRD (end [facility] disease) ([facility]     ICD-10-CM: N18.6 ICD-9-CM: 585.6 Mixed hyperlipidemia     ICD-10-CM: E78.2 ICD-9-CM: 272.2 Vitals BP Pulse Height(growth percentile) Weight(growth percentile) BMI Smoking Status 140/60 60 5' 6\" (1.676 m) 174 lb 6.4 oz (79.1 kg) 28.15 kg/m2 Former Smoker Vitals History BMI and BSA Data Body Mass Index Body Surface Area  
 28.15 kg/m 2 1.92 m 2 Preferred Pharmacy Pharmacy Name Phone [facility] [address], [address] [facility] [address]  [facility] [phone] Your Updated Medication List  
  
   
This list is accurate as of: 8/10/17  2:15 PM.  Always use your most recent med list.  
  
  
  
  
 allopurinol 100 mg tablet Commonly known as:  Viola Harries Take 100 mg by mouth Three (3) times a week. Mondays Wednesday Fridays  
  
 amLODIPine 10 mg tablet Commonly known as:  Etta Chimes Take 1 Tab by mouth daily. Indications: hypertension  
  
 atorvastatin 80 mg tablet Commonly known as:  LIPITOR Take 80 mg by mouth nightly. furosemide 80 mg tablet Commonly known as:  LASIX TAKE 1 TABLET BY MOUTH DAILY  
  
 lisinopril 20 mg tablet Commonly known as:  Kenn Human Take 1 Tab by mouth daily. metoprolol succinate 25 mg XL tablet Commonly known as:  TOPROL-XL Take 1 Tab by mouth daily. RENVELA 800 mg Tab tab Generic drug:  sevelamer carbonate Take 1,600 mg by mouth three (3) times daily. tamsulosin 0.4 mg capsule Commonly known as:  FLOMAX TAKE 1 CAPSULE BY MOUTH EVERY DAY Follow-up Instructions Return in about 6 months (around 2/10/2018). Introducing [facility]! Dear Penelope Aguirre: Thank you for requesting a [employer] account. Our records indicate that you already have an active [employer] Me account. You can access your account anytime at [email or web address] Did you know that you can access your hospital and ER discharge instructions at any time in Inter[employer] Me? You can also review all of your test results from your hospital stay or ER visit. Additional Information If you have questions, please visit the Frequently Asked Questions section of the Inter[employer] Me website at [email or web address] Remember, Inter[employer] Me is NOT to be used for urgent needs. For medical emergencies, dial 911. Now available from your iPhone and Android! Please provide this summary of care documentation to your next provider. Your primary care clinician is listed as Israel Yousif. If you have any questions after today's visit, please call [phone].

## 2017-08-10 NOTE — Clinical Note
HISTORY OF PRESENT ILLNESS Prince Benites is a [de-identified] y.o. male SUMMARY:  
Problem List  Date Reviewed: 8/9/2017 Codes Class Noted Bradycardia ICD-10-CM: R00.1 ICD-9-CM: 427.89  12/21/2015 Overview Addendum 1/7/2016 10:55 AM by Fausto Escalera MD  
  12/15 lopressor d/neela by Dr April Petty, was taking 100 mg 
will start 25 mg and watch for tamara 1/16 tolerating toprol xl 25/day ESRD (end stage renal disease) ([facility] ICD-10-CM: N18.6 ICD-9-CM: 585.6  12/14/2015 Overview Signed 2/2/2017  8:55 AM by Fausto Escalera MD  
  HD since 9/16 approx Undiagnosed cardiac murmurs ICD-10-CM: R01.1 ICD-9-CM: 785.2  5/14/2014 Overview Signed 5/14/2014  3:48 PM by Fausto Escalera MD  
  r/o AS Coronary atherosclerosis of native coronary artery ICD-10-CM: I25.10 ICD-9-CM: 414.01  Unknown Overview Addendum 5/9/2017  2:06 PM by Kulwant Noe MD  
  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  
  8/16 LDL35; 5/16 LDL32; 12/15 LDL21; 6/15  Low density lipoproteins (LDLs) are at goal, triglycerides are at goal, high density lipoproteins (HDLs) are at goal 
  
  
   
 Postsurgical aortocoronary bypass status ICD-10-CM: Z95.1 ICD-9-CM: V45.81  Unknown Essential hypertension ICD-10-CM: I10 
ICD-9-CM: 401.9  Unknown Overview Addendum 2/2/2017  8:52 AM by Fausto Escalera MD  
  2/17 incr norvasc to 10;  
F/U Dr. April Petty Mitral regurgitation ICD-10-CM: I34.0 ICD-9-CM: 424.0  Unknown Overview Addendum 5/9/2017  7:15 AM by Kulwant Noe MD  
  2/10 trace MR;  
5/14 echo normal lvef, mild MR and tr without pul htn. Aortic root 3.8cm BPH (benign prostatic hyperplasia) ICD-10-CM: N40.0 ICD-9-CM: 600.00  3/14/2013 Lower urinary tract symptoms (LUTS) ICD-10-CM: R39.9 ICD-9-CM: 788.99  3/14/2013 Elevated prostate specific antigen (PSA) ICD-10-CM: R97.20 ICD-9-CM: 790.93  3/14/2013 Renal insufficiency ICD-10-CM: N28.9 ICD-9-CM: 593.9  3/14/2013 Current Outpatient Prescriptions on File Prior to Visit Medication Sig  tamsulosin (FLOMAX) 0.4 mg capsule TAKE 1 CAPSULE BY MOUTH EVERY DAY  lisinopril (PRINIVIL, ZESTRIL) 20 mg tablet Take 1 Tab by mouth daily.  metoprolol succinate (TOPROL-XL) 25 mg XL tablet Take 1 Tab by mouth daily.  amLODIPine (NORVASC) 10 mg tablet Take 1 Tab by mouth daily. Indications: hypertension  furosemide (LASIX) 80 mg tablet TAKE 1 TABLET BY MOUTH DAILY  sevelamer carbonate (RENVELA) 800 mg tab tab Take 1,600 mg by mouth three (3) times daily.  allopurinol (ZYLOPRIM) 100 mg tablet Take 100 mg by mouth Three (3) times a week. Mondays Wednesday Fridays  atorvastatin (LIPITOR) 80 mg tablet Take 80 mg by mouth nightly. No current facility-administered medications on file prior to visit. CARDIOLOGY STUDIES TO DATE: 
5/14 echo normal lvef, mild MR and tr without pul htn. Aortic root 3.8cm 2010 normal lexiscan cardiolyte 
  
 
 
Chief Complaint Patient presents with  Coronary Artery Disease HPI : 
Mr. Saleem Campuzano is doing pretty well. He is walking some, though not as much as he should, and having no problems with his medications and no worrisome cardiac symptoms. His dialysis fistula apparently clotted off, and he had to have it worked on the other day, and his daughter tells me they put a stent in it. He brought in some blood work from a couple of days ago. His hemoglobin A1c was 5.2, total cholesterol was 142, triglycerides were 97, HDL was 59 and LDL was 63.   
 
CARDIAC ROS:  
negative for chest pain, dyspnea, palpitations, syncope, orthopnea, paroxysmal nocturnal dyspnea, exertional chest pressure/discomfort, claudication, lower extremity edema Family History Problem Relation Age of Onset  Cancer Mother  Heart Disease Neg Hx   
 Heart Attack Neg Hx  Sudden Death Neg Hx  Stroke Neg Hx Past Medical History:  
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 Deuce Cobb  Bradycardia 12/21/2015  
 12/15 lopressor d/neela by Dr Deuce Cobb, was taking 100 mg will start 25 mg and watch for tamara  CAD (coronary artery disease)  Chronic kidney disease  Coronary atherosclerosis of native coronary artery Stable symptoms  Essential hypertension, benign Controlled  Hypercholesteremia  Mitral valve disorders  Nausea & vomiting  Other and unspecified hyperlipidemia 3/12 Low density lipoproteins (LDLs) are at goal, triglycerides are at goal, high density lipoproteins (HDLs) are at goal  
 Postsurgical aortocoronary bypass status 1991  Prostate disease GENERAL ROS: 
A comprehensive review of systems was negative except for that written in the HPI. Visit Vitals  /60  Pulse 60  Ht 5' 6\" (1.676 m)  Wt 174 lb 6.4 oz (79.1 kg)  BMI 28.15 kg/m2 Wt Readings from Last 3 Encounters:  
08/10/17 174 lb 6.4 oz (79.1 kg) 06/22/17 162 lb (73.5 kg) 05/09/17 167 lb 3.2 oz (75.8 kg) BP Readings from Last 3 Encounters:  
08/10/17 140/60  
06/22/17 147/45  
05/09/17 124/42 PHYSICAL EXAM 
General appearance: alert, cooperative, no distress, appears stated age Neck: supple, symmetrical, trachea midline, no adenopathy, no carotid bruit and no JVD Lungs: clear to auscultation bilaterally Heart: regular rate and rhythm, S1, S2 normal, no murmur, click, rub or gallop Extremities: extremities normal, atraumatic, no cyanosis or edema Lab Results Component Value Date/Time  Cholesterol, total 112 08/08/2016 06:53 AM  
 Cholesterol, total 116 07/11/2016 07:18 AM  
 Cholesterol, total 116 05/31/2016 07:00 AM  
 Cholesterol, total 125 04/11/2016 08:02 AM  
 Cholesterol, total 115 03/07/2016 06:48 AM  
 HDL Cholesterol 62 08/08/2016 06:53 AM  
 HDL Cholesterol 63 07/11/2016 07:18 AM  
 HDL Cholesterol 69 05/31/2016 07:00 AM  
 HDL Cholesterol 58 04/11/2016 08:02 AM  
 HDL Cholesterol 57 03/07/2016 06:48 AM  
 LDL, calculated 35.2 08/08/2016 06:53 AM  
 LDL, calculated 35.4 07/11/2016 07:18 AM  
 LDL, calculated 32.4 05/31/2016 07:00 AM  
 LDL, calculated 40.2 04/11/2016 08:02 AM  
 LDL, calculated 39.6 03/07/2016 06:48 AM  
 Triglyceride 74 08/08/2016 06:53 AM  
 Triglyceride 88 07/11/2016 07:18 AM  
 Triglyceride 73 05/31/2016 07:00 AM  
 Triglyceride 134 04/11/2016 08:02 AM  
 Triglyceride 92 03/07/2016 06:48 AM  
 CHOL/HDL Ratio 1.8 08/08/2016 06:53 AM  
 CHOL/HDL Ratio 1.8 07/11/2016 07:18 AM  
 CHOL/HDL Ratio 1.7 05/31/2016 07:00 AM  
 CHOL/HDL Ratio 2.2 04/11/2016 08:02 AM  
 CHOL/HDL Ratio 2.0 03/07/2016 06:48 AM  
 
ASSESSMENT Mr. Renate Connors is stable, asymptomatic and well-compensated on a good medical regimen and needs no cardiac testing at this time. current treatment plan is effective, no change in therapy 
lab results and schedule of future lab studies reviewed with patient 
reviewed diet, exercise and weight control Encounter Diagnoses Name Primary?  Atherosclerosis of native coronary artery of native heart without angina pectoris Yes  Essential hypertension  Bradycardia  ESRD (end stage renal disease) ([facility]  Mixed hyperlipidemia No orders of the defined types were placed in this encounter. Follow-up Disposition: 
Return in about 6 months (around 2/10/2018). Leda Sanderson MD 
8/10/2017

## 2017-08-10 NOTE — PROGRESS NOTES
Mr. Donna Whipple is stable, asymptomatic and well-compensated on a good medical regimen and needs no cardiac testing at this time.

## 2017-08-10 NOTE — PROGRESS NOTES
HISTORY OF PRESENT ILLNESS  Esmer Lentz is a [de-identified] y.o. male     SUMMARY:   Problem List  Date Reviewed: 8/9/2017          Codes Class Noted    Bradycardia ICD-10-CM: R00.1  ICD-9-CM: 427.89  12/21/2015    Overview Addendum 1/7/2016 10:55 AM by Tereza Sunshine MD     12/15 lopressor d/neela by Dr Shamir Aguilar, was taking 100 mg  will start 25 mg and watch for tamara  1/16 tolerating toprol xl 25/day             ESRD (end stage renal disease) ([facility] [address] ICD-10-CM: N18.6  ICD-9-CM: 585.6  12/14/2015    Overview Signed 2/2/2017  8:55 AM by Tereza Sunshine MD     HD since 9/16 approx             Undiagnosed cardiac murmurs ICD-10-CM: R01.1  ICD-9-CM: 785.2  5/14/2014    Overview Signed 5/14/2014  3:48 PM by Tereza Sunshine MD     r/o AS             Coronary atherosclerosis of native coronary artery ICD-10-CM: I25.10  ICD-9-CM: 414.01  Unknown    Overview Addendum 5/9/2017  2:06 PM by Iram Peña MD     Stable symptoms  1991 single vessel cabg after failed pci, details unknown             HLD (hyperlipidemia) ICD-10-CM: E78.5  ICD-9-CM: 272.4  Unknown    Overview Addendum 2/2/2017  8:46 AM by Tereza Sunshine MD     8/16 Clarisapiper Spenceuchin; 5/16 LDL32; 12/15 LDL21; 6/15  Low density lipoproteins (LDLs) are at goal, triglycerides are at goal, high density lipoproteins (HDLs) are at goal             Postsurgical aortocoronary bypass status ICD-10-CM: Z95.1  ICD-9-CM: V45.81  Unknown        Essential hypertension ICD-10-CM: I10  ICD-9-CM: 401.9  Unknown    Overview Addendum 2/2/2017  8:52 AM by Tereza Sunshine MD     2/17 incr norvasc to 10;   F/U Dr. Shamir Aguilar             Mitral regurgitation ICD-10-CM: I34.0  ICD-9-CM: 424.0  Unknown    Overview Addendum 5/9/2017  7:15 AM by Iram Peña MD     2/10 trace MR;   5/14 echo normal lvef, mild MR and tr without pul htn.  Aortic root 3.8cm             BPH (benign prostatic hyperplasia) ICD-10-CM: N40.0  ICD-9-CM: 600.00  3/14/2013        Lower urinary tract symptoms (LUTS) ICD-10-CM: R39.9  ICD-9-CM: 788.99  3/14/2013        Elevated prostate specific antigen (PSA) ICD-10-CM: R97.20  ICD-9-CM: 790.93  3/14/2013        Renal insufficiency ICD-10-CM: N28.9  ICD-9-CM: 593.9  3/14/2013              Current Outpatient Prescriptions on File Prior to Visit   Medication Sig    tamsulosin (FLOMAX) 0.4 mg capsule TAKE 1 CAPSULE BY MOUTH EVERY DAY    lisinopril (PRINIVIL, ZESTRIL) 20 mg tablet Take 1 Tab by mouth daily.  metoprolol succinate (TOPROL-XL) 25 mg XL tablet Take 1 Tab by mouth daily.  amLODIPine (NORVASC) 10 mg tablet Take 1 Tab by mouth daily. Indications: hypertension    furosemide (LASIX) 80 mg tablet TAKE 1 TABLET BY MOUTH DAILY    sevelamer carbonate (RENVELA) 800 mg tab tab Take 1,600 mg by mouth three (3) times daily.  allopurinol (ZYLOPRIM) 100 mg tablet Take 100 mg by mouth Three (3) times a week. Mondays Wednesday Fridays    atorvastatin (LIPITOR) 80 mg tablet Take 80 mg by mouth nightly. No current facility-administered medications on file prior to visit. CARDIOLOGY STUDIES TO DATE:  5/14 echo normal lvef, mild MR and tr without pul htn.  Aortic root 3.8cm  2010 normal lexiscan cardiolyte         Chief Complaint   Patient presents with    Coronary Artery Disease     HPI :   Dictation on: 08/10/2017  3:05 PM by: Eric Ashley [[id number]]     CARDIAC ROS:   negative for chest pain, dyspnea, palpitations, syncope, orthopnea, paroxysmal nocturnal dyspnea, exertional chest pressure/discomfort, claudication, lower extremity edema    Family History   Problem Relation Age of Onset    Cancer Mother     Heart Disease Neg Hx     Heart Attack Neg Hx     Sudden Death Neg Hx     Stroke Neg Hx        Past Medical History:   Diagnosis Date    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 Ankur Hou Bradycardia 12/21/2015    12/15 lopressor d/neela by Dr Caroline Sequeira, was taking 100 mg will start 25 mg and watch for tamara     CAD (coronary artery disease)     Chronic kidney disease     Coronary atherosclerosis of native coronary artery     Stable symptoms    Essential hypertension, benign     Controlled    Hypercholesteremia     Mitral valve disorders     Nausea & vomiting     Other and unspecified hyperlipidemia     3/12 Low density lipoproteins (LDLs) are at goal, triglycerides are at goal, high density lipoproteins (HDLs) are at goal    Postsurgical aortocoronary bypass status 1991    Prostate disease        GENERAL ROS:  A comprehensive review of systems was negative except for that written in the HPI.     Visit Vitals    /60    Pulse 60    Ht 5' 6\" (1.676 m)    Wt 174 lb 6.4 oz (79.1 kg)    BMI 28.15 kg/m2       Wt Readings from Last 3 Encounters:   08/10/17 174 lb 6.4 oz (79.1 kg)   06/22/17 162 lb (73.5 kg)   05/09/17 167 lb 3.2 oz (75.8 kg)            BP Readings from Last 3 Encounters:   08/10/17 140/60   06/22/17 147/45   05/09/17 124/42       PHYSICAL EXAM  General appearance: alert, cooperative, no distress, appears stated age  Neck: supple, symmetrical, trachea midline, no adenopathy, no carotid bruit and no JVD  Lungs: clear to auscultation bilaterally  Heart: regular rate and rhythm, S1, S2 normal, no murmur, click, rub or gallop  Extremities: extremities normal, atraumatic, no cyanosis or edema    Lab Results   Component Value Date/Time    Cholesterol, total 112 08/08/2016 06:53 AM    Cholesterol, total 116 07/11/2016 07:18 AM    Cholesterol, total 116 05/31/2016 07:00 AM    Cholesterol, total 125 04/11/2016 08:02 AM    Cholesterol, total 115 03/07/2016 06:48 AM    HDL Cholesterol 62 08/08/2016 06:53 AM    HDL Cholesterol 63 07/11/2016 07:18 AM    HDL Cholesterol 69 05/31/2016 07:00 AM    HDL Cholesterol 58 04/11/2016 08:02 AM    HDL Cholesterol 57 03/07/2016 06:48 AM    LDL, calculated 35.2 08/08/2016 06:53 AM    LDL, calculated 35.4 07/11/2016 07:18 AM    LDL, calculated 32.4 05/31/2016 07:00 AM    LDL, calculated 40.2 04/11/2016 08:02 AM    LDL, calculated 39.6 03/07/2016 06:48 AM    Triglyceride 74 08/08/2016 06:53 AM    Triglyceride 88 07/11/2016 07:18 AM    Triglyceride 73 05/31/2016 07:00 AM    Triglyceride 134 04/11/2016 08:02 AM    Triglyceride 92 03/07/2016 06:48 AM    CHOL/HDL Ratio 1.8 08/08/2016 06:53 AM    CHOL/HDL Ratio 1.8 07/11/2016 07:18 AM    CHOL/HDL Ratio 1.7 05/31/2016 07:00 AM    CHOL/HDL Ratio 2.2 04/11/2016 08:02 AM    CHOL/HDL Ratio 2.0 03/07/2016 06:48 AM     ASSESSMENT   Dictation on: 08/10/2017  3:05 PM by: Robin Salcido [[id number]]      current treatment plan is effective, no change in therapy  lab results and schedule of future lab studies reviewed with patient  reviewed diet, exercise and weight control    Encounter Diagnoses   Name Primary?  Atherosclerosis of native coronary artery of native heart without angina pectoris Yes    Essential hypertension     Bradycardia     ESRD (end stage renal disease) ([facility] [address]     Mixed hyperlipidemia      No orders of the defined types were placed in this encounter. Follow-up Disposition:  Return in about 6 months (around 2/10/2018).     Tejas Giang MD  8/10/2017

## 2018-02-16 ENCOUNTER — OFFICE VISIT (OUTPATIENT)
Dept: CARDIOLOGY CLINIC | Age: 83
End: 2018-02-16

## 2018-02-16 VITALS
OXYGEN SATURATION: 98 % | HEIGHT: 66 IN | DIASTOLIC BLOOD PRESSURE: 60 MMHG | RESPIRATION RATE: 16 BRPM | SYSTOLIC BLOOD PRESSURE: 130 MMHG | WEIGHT: 168 LBS | BODY MASS INDEX: 27 KG/M2 | HEART RATE: 72 BPM

## 2018-02-16 DIAGNOSIS — E78.2 MIXED HYPERLIPIDEMIA: ICD-10-CM

## 2018-02-16 DIAGNOSIS — I10 ESSENTIAL HYPERTENSION: ICD-10-CM

## 2018-02-16 DIAGNOSIS — N18.6 ESRD (END STAGE RENAL DISEASE) (HCC): ICD-10-CM

## 2018-02-16 DIAGNOSIS — I25.10 ATHEROSCLEROSIS OF NATIVE CORONARY ARTERY OF NATIVE HEART WITHOUT ANGINA PECTORIS: Primary | ICD-10-CM

## 2018-02-16 NOTE — MR AVS SNAPSHOT
[address] [facility] [address] [facility] 
[phone] Patient: Leni Mak MRN: [id number] :1935 Visit Information Date & Time Provider Department Dept. Phone Encounter #  
 2018 10:20 AM Gibson Marie MD [facility] Anu Cho [phone] [id number] Follow-up Instructions Return in about 6 months (around 2018). Upcoming Health Maintenance Date Due DTaP/Tdap/Td series (1 - Tdap) 1957 ZOSTER VACCINE AGE 60> 10/27/1996 GLAUCOMA SCREENING Q2Y 2001 Pneumococcal 65+ High/Highest Risk (1 of 2 - PCV13) 2001 MEDICARE YEARLY EXAM 2001 Influenza Age 5 to Adult 2017 Allergies as of 2018  Review Complete On: 2018 By: Gibson Marie MD  
  
 Severity Noted Reaction Type Reactions Other Medication  2013    Unknown (comments) ANESTHESIA Current Immunizations  Never Reviewed No immunizations on file. Not reviewed this visit Vitals BP Pulse Resp Height(growth percentile) Weight(growth percentile) SpO2  
 130/60 (BP 1 Location: Right arm, BP Patient Position: Sitting) 72 16 5' 6\" (1.676 m) 168 lb (76.2 kg) 98% BMI Smoking Status 27.12 kg/m2 Former Smoker BMI and BSA Data Body Mass Index Body Surface Area  
 27.12 kg/m 2 1.88 m 2 Preferred Pharmacy Pharmacy Name Phone [facility] [address] [place] [address] [address]  [facility] [phone] Your Updated Medication List  
  
   
This list is accurate as of: 18 10:33 AM.  Always use your most recent med list.  
  
  
  
  
 allopurinol 100 mg tablet Commonly known as:  Quyen Gallus Take 100 mg by mouth Three (3) times a week.   
  
 amLODIPine 10 mg tablet Commonly known as:  Yolande Barboza Take 1 Tab by mouth daily. Indications: hypertension atorvastatin 80 mg tablet Commonly known as:  LIPITOR Take 80 mg by mouth nightly. furosemide 80 mg tablet Commonly known as:  LASIX TAKE 1 TABLET BY MOUTH DAILY  
  
 lisinopril 20 mg tablet Commonly known as:  Alcario Parth Take 1 Tab by mouth daily. metoprolol succinate 25 mg XL tablet Commonly known as:  TOPROL-XL  
TAKE 1 TABLET BY MOUTH EVERY DAY  
  
 RENVELA 800 mg Tab tab Generic drug:  sevelamer carbonate Take 1,600 mg by mouth three (3) times daily. tamsulosin 0.4 mg capsule Commonly known as:  FLOMAX TAKE 1 CAPSULE BY MOUTH EVERY DAY Follow-up Instructions Return in about 6 months (around 8/16/2018). Introducing [facility]! Dear Neva Ferraro: Thank you for requesting a [employer] account. Our records indicate that you already have an active [employer] account. You can access your account anytime at [email or web address] Did you know that you can access your hospital and ER discharge instructions at any time in [employer]? You can also review all of your test results from your hospital stay or ER visit. Additional Information If you have questions, please visit the Frequently Asked Questions section of the [employer] website at [email or web address] Remember, [employer] is NOT to be used for urgent needs. For medical emergencies, dial 911. Now available from your iPhone and Android! Please provide this summary of care documentation to your next provider. Your primary care clinician is listed as Boby Fischer. If you have any questions after today's visit, please call [phone].

## 2018-02-16 NOTE — PROGRESS NOTES
HISTORY OF PRESENT ILLNESS  Berdie Burkitt is a 81 y.o. male     SUMMARY:   Problem List  Date Reviewed: 2/16/2018          Codes Class Noted    Bradycardia ICD-10-CM: R00.1  ICD-9-CM: 427.89  12/21/2015    Overview Addendum 1/7/2016 10:55 AM by Amirah Harrington MD     12/15 lopressor d/neela by Dr Cheryl Pandey, was taking 100 mg  will start 25 mg and watch for tamara  1/16 tolerating toprol xl 25/day             ESRD (end stage renal disease) ([facility] [address] ICD-10-CM: N18.6  ICD-9-CM: 585.6  12/14/2015    Overview Signed 2/2/2017  8:55 AM by Amirah Harrington MD     HD since 9/16 approx             Undiagnosed cardiac murmurs ICD-10-CM: R01.1  ICD-9-CM: 785.2  5/14/2014    Overview Signed 5/14/2014  3:48 PM by Amirah Harrington MD     r/o AS             Coronary atherosclerosis of native coronary artery ICD-10-CM: I25.10  ICD-9-CM: 414.01  Unknown    Overview Addendum 5/9/2017  2:06 PM by Eb Miller MD     Stable symptoms  1991 single vessel cabg after failed pci, details unknown             HLD (hyperlipidemia) ICD-10-CM: E78.5  ICD-9-CM: 272.4  Unknown    Overview Addendum 2/2/2017  8:46 AM by Amirah Harrington MD     8/16 Td Meth; 5/16 LDL32; 12/15 LDL21; 6/15  Low density lipoproteins (LDLs) are at goal, triglycerides are at goal, high density lipoproteins (HDLs) are at goal             Postsurgical aortocoronary bypass status ICD-10-CM: Z95.1  ICD-9-CM: V45.81  Unknown        Essential hypertension ICD-10-CM: I10  ICD-9-CM: 401.9  Unknown    Overview Addendum 2/2/2017  8:52 AM by Amirah Harrington MD     2/17 incr norvasc to 10;   F/U Dr. Cheryl Pandey             Mitral regurgitation ICD-10-CM: I34.0  ICD-9-CM: 424.0  Unknown    Overview Addendum 5/9/2017  7:15 AM by Eb Miller MD     2/10 trace MR;   5/14 echo normal lvef, mild MR and tr without pul htn.  Aortic root 3.8cm             BPH (benign prostatic hyperplasia) ICD-10-CM: N40.0  ICD-9-CM: 600.00  3/14/2013        Lower urinary tract symptoms (LUTS) ICD-10-CM: R39.9  ICD-9-CM: 788.99  3/14/2013        Elevated prostate specific antigen (PSA) ICD-10-CM: R97.20  ICD-9-CM: 790.93  3/14/2013        Renal insufficiency ICD-10-CM: N28.9  ICD-9-CM: 593.9  3/14/2013              Current Outpatient Prescriptions on File Prior to Visit   Medication Sig    metoprolol succinate (TOPROL-XL) 25 mg XL tablet TAKE 1 TABLET BY MOUTH EVERY DAY    tamsulosin (FLOMAX) 0.4 mg capsule TAKE 1 CAPSULE BY MOUTH EVERY DAY    lisinopril (PRINIVIL, ZESTRIL) 20 mg tablet Take 1 Tab by mouth daily.  amLODIPine (NORVASC) 10 mg tablet Take 1 Tab by mouth daily. Indications: hypertension    furosemide (LASIX) 80 mg tablet TAKE 1 TABLET BY MOUTH DAILY    sevelamer carbonate (RENVELA) 800 mg tab tab Take 1,600 mg by mouth three (3) times daily.  allopurinol (ZYLOPRIM) 100 mg tablet Take 100 mg by mouth Three (3) times a week. Mondays Wednesday Fridays    atorvastatin (LIPITOR) 80 mg tablet Take 80 mg by mouth nightly. No current facility-administered medications on file prior to visit. CARDIOLOGY STUDIES TO DATE:  5/14 echo normal lvef, mild MR and tr without pul htn. Aortic root 3.8cm  2010 normal lexiscan cardiolyte          Chief Complaint   Patient presents with    Coronary Artery Disease     HPI :  Mr. Dolores Arteaga is doing really well. He is fairly active, enjoys doing exercises with his Wi device and is having no issues with dialysis. He got a little confused about his medications and stopped his blood pressure medications for a while because he misunderstood something they said at dialysis, but that is all straight. He recently saw Dr. Ruperto Owen and had some blood work including a lipid profile.        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 Meng Conway Bradycardia 12/21/2015    12/15 lopressor d/neela by Dr Sugar Antoine, was taking 100 mg will start 25 mg and watch for tamara     CAD (coronary artery disease)     Chronic kidney disease     Coronary atherosclerosis of native coronary artery     Stable symptoms    Essential hypertension, benign     Controlled    Hypercholesteremia     Mitral valve disorders(424.0)     Nausea & vomiting     Other and unspecified hyperlipidemia     3/12 Low density lipoproteins (LDLs) are at goal, triglycerides are at goal, high density lipoproteins (HDLs) are at goal    Postsurgical aortocoronary bypass status 1991    Prostate disease        GENERAL ROS:  A comprehensive review of systems was negative except for that written in the HPI.     Visit Vitals    /60 (BP 1 Location: Right arm, BP Patient Position: Sitting)    Pulse 72    Resp 16    Ht 5' 6\" (1.676 m)    Wt 168 lb (76.2 kg)    SpO2 98%    BMI 27.12 kg/m2       Wt Readings from Last 3 Encounters:   02/16/18 168 lb (76.2 kg)   08/10/17 174 lb 6.4 oz (79.1 kg)   06/22/17 162 lb (73.5 kg)            BP Readings from Last 3 Encounters:   02/16/18 130/60   08/10/17 140/60   06/22/17 147/45       PHYSICAL EXAM  General appearance: alert, cooperative, no distress, appears stated age  Neck: supple, symmetrical, trachea midline, no adenopathy, no carotid bruit and no JVD  Lungs: clear to auscultation bilaterally  Heart: regular rate and rhythm, S1, S2 normal, no murmur, click, rub or gallop  Extremities: extremities normal, atraumatic, no cyanosis or edema    Lab Results   Component Value Date/Time    Cholesterol, total 112 08/08/2016 06:53 AM    Cholesterol, total 116 07/11/2016 07:18 AM    Cholesterol, total 116 05/31/2016 07:00 AM    Cholesterol, total 125 04/11/2016 08:02 AM    Cholesterol, total 115 03/07/2016 06:48 AM    HDL Cholesterol 62 (H) 08/08/2016 06:53 AM    HDL Cholesterol 63 (H) 07/11/2016 07:18 AM    HDL Cholesterol 69 (H) 05/31/2016 07:00 AM    HDL Cholesterol 58 04/11/2016 08:02 AM    HDL Cholesterol 57 03/07/2016 06:48 AM    LDL, calculated 35.2 08/08/2016 06:53 AM    LDL, calculated 35.4 07/11/2016 07:18 AM    LDL, calculated 32.4 05/31/2016 07:00 AM    LDL, calculated 40.2 04/11/2016 08:02 AM    LDL, calculated 39.6 03/07/2016 06:48 AM    Triglyceride 74 08/08/2016 06:53 AM    Triglyceride 88 07/11/2016 07:18 AM    Triglyceride 73 05/31/2016 07:00 AM    Triglyceride 134 04/11/2016 08:02 AM    Triglyceride 92 03/07/2016 06:48 AM    CHOL/HDL Ratio 1.8 08/08/2016 06:53 AM    CHOL/HDL Ratio 1.8 07/11/2016 07:18 AM    CHOL/HDL Ratio 1.7 05/31/2016 07:00 AM    CHOL/HDL Ratio 2.2 04/11/2016 08:02 AM    CHOL/HDL Ratio 2.0 03/07/2016 06:48 AM     ASSESSMENT  Mr. Case Yang is stable and asymptomatic, well compensated on a good medical regimen and needs no cardiac testing at this time. We will track down his recent lipid results. current treatment plan is effective, no change in therapy  lab results and schedule of future lab studies reviewed with patient  reviewed diet, exercise and weight control    Encounter Diagnoses   Name Primary?  Atherosclerosis of native coronary artery of native heart without angina pectoris Yes    Mixed hyperlipidemia     Essential hypertension     ESRD (end stage renal disease) (HCC)      No orders of the defined types were placed in this encounter. Follow-up Disposition:  Return in about 6 months (around 8/16/2018).     Vinay Funk MD  2/16/2018

## 2018-04-06 ENCOUNTER — OFFICE VISIT (OUTPATIENT)
Dept: SURGERY | Age: 83
End: 2018-04-06

## 2018-04-06 VITALS
HEART RATE: 64 BPM | BODY MASS INDEX: 25.88 KG/M2 | TEMPERATURE: 98.3 F | DIASTOLIC BLOOD PRESSURE: 46 MMHG | WEIGHT: 161 LBS | HEIGHT: 66 IN | SYSTOLIC BLOOD PRESSURE: 126 MMHG

## 2018-04-06 DIAGNOSIS — K40.90 RIGHT INGUINAL HERNIA: Primary | ICD-10-CM

## 2018-04-06 RX ORDER — NEPHROCAP 1 MG
CAP ORAL
Refills: 5 | COMMUNITY
Start: 2018-03-16

## 2018-04-06 RX ORDER — ASPIRIN 325 MG
325 TABLET, DELAYED RELEASE (ENTERIC COATED) ORAL DAILY
COMMUNITY

## 2018-04-06 NOTE — PROGRESS NOTES
Surgery Consult    Subjective:     Cristiano Zhou is a 81 y.o.  male who was referred for evaluation of  right inguinal hernia. Patient has a long standing history of a RIH. It never caused him pain until about 2 weeks ago. Since that time he has increasing pain, anorexia, bloating and nausea. Pain is sharp and rated as moderate. Lump is not reducible. Patient does not have symptoms of chronic constipation, chronic cough, difficulty urinating. Patient does not have a history of previous hernia surgery.     Patient is on HD for renal failure MWF    Patient Active Problem List    Diagnosis Date Noted    Bradycardia 12/21/2015    ESRD (end stage renal disease) ([facility] [address] 12/14/2015    Undiagnosed cardiac murmurs 05/14/2014    Coronary atherosclerosis of native coronary artery     HLD (hyperlipidemia)     Postsurgical aortocoronary bypass status     Essential hypertension     Mitral regurgitation     BPH (benign prostatic hyperplasia) 03/14/2013    Lower urinary tract symptoms (LUTS) 03/14/2013    Elevated prostate specific antigen (PSA) 03/14/2013    Renal insufficiency 03/14/2013     Past Medical History:   Diagnosis Date    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 Valeriano Hannah Bradycardia 12/21/2015    12/15 lopressor d/neela by Dr Doris Connors, was taking 100 mg will start 25 mg and watch for tamara     CAD (coronary artery disease)     Chronic kidney disease     on dialysis  M-W F    Coronary atherosclerosis of native coronary artery     Stable symptoms    Essential hypertension, benign     Controlled    Hypercholesteremia     Mitral valve disorders(424.0)     Nausea & vomiting     Other and unspecified hyperlipidemia     3/12 Low density lipoproteins (LDLs) are at goal, triglycerides are at goal, high density lipoproteins (HDLs) are at goal    Postsurgical aortocoronary bypass status 12    Prostate disease      Past Surgical History:   Procedure Laterality Date    CARDIAC SURG PROCEDURE UNLIST  1991    HX CORONARY ARTERY BYPASS GRAFT  1991    x 1    VASCULAR SURGERY PROCEDURE UNLIST  2016    dialysis fistula left upper arm      Family History   Problem Relation Age of Onset    Cancer Mother     Heart Disease Neg Hx     Heart Attack Neg Hx     Sudden Death Neg Hx     Stroke Neg Hx       Social History   Substance Use Topics    Smoking status: Former Smoker     Quit date: 11/1/1983    Smokeless tobacco: Never Used    Alcohol use No      Current Outpatient Prescriptions   Medication Sig    VIRT-CAPS 1 mg capsule TK ONE C PO  DAILY    aspirin delayed-release (ECOTRIN) 325 mg tablet Take 325 mg by mouth daily. Indications: myocardial infarction prevention    metoprolol succinate (TOPROL-XL) 25 mg XL tablet TAKE 1 TABLET BY MOUTH EVERY DAY    tamsulosin (FLOMAX) 0.4 mg capsule TAKE 1 CAPSULE BY MOUTH EVERY DAY    lisinopril (PRINIVIL, ZESTRIL) 20 mg tablet Take 1 Tab by mouth daily.  amLODIPine (NORVASC) 10 mg tablet Take 1 Tab by mouth daily. Indications: hypertension    sevelamer carbonate (RENVELA) 800 mg tab tab Take 1,600 mg by mouth three (3) times daily (with meals).  allopurinol (ZYLOPRIM) 100 mg tablet Take 100 mg by mouth Three (3) times a week. Mondays Wednesday Fridays    atorvastatin (LIPITOR) 80 mg tablet Take 80 mg by mouth nightly.  furosemide (LASIX) 80 mg tablet TAKE 1 TABLET BY MOUTH DAILY     No current facility-administered medications for this visit. Allergies   Allergen Reactions    Other Medication Unknown (comments)     ANESTHESIA. . Nausea and vomit   Pt reports that he gets the side effect of nausea & vomiting after anesthesia  but has never had any reaction to anesthesia other than nausea and vomiting.   Denies rash, difficulty breathing or any other reaction        Review of Systems:  A comprehensive review of systems was negative except for that written in the History of Present Illness. Objective:     Blood pressure 126/46, pulse 64, temperature 98.3 °F (36.8 °C), temperature source Oral, height 5' 6\" (1.676 m), weight 161 lb (73 kg). Physical Exam:  General:  Alert, cooperative, no distress, appears stated age. Eyes:  Conjunctivae/corneas clear. , EOMs intact. Lungs:   Clear to auscultation bilaterally. Heart:  Regular rate and rhythm, S1, S2 normal, no murmur, click, rub or gallop. Abdomen:   Soft, non-tender. Bowel sounds normal. No masses,  No organomegaly. : Moderate size, RIH that is only partially reducible, no palpable LIH       Labs:   Recent Results (from the past 24 hour(s))   CBC WITH AUTOMATED DIFF    Collection Time: 04/10/18  9:11 AM   Result Value Ref Range    WBC 4.4 4.1 - 11.1 K/uL    RBC 4.76 4.10 - 5.70 M/uL    HGB 12.4 12.1 - 17.0 g/dL    HCT 41.5 36.6 - 50.3 %    MCV 87.2 80.0 - 99.0 FL    MCH 26.1 26.0 - 34.0 PG    MCHC 29.9 (L) 30.0 - 36.5 g/dL    RDW 14.7 (H) 11.5 - 14.5 %    PLATELET 948 (L) 470 - 400 K/uL    MPV 10.4 8.9 - 12.9 FL    NRBC 0.0 0  WBC    ABSOLUTE NRBC 0.00 0.00 - 0.01 K/uL    NEUTROPHILS 53 32 - 75 %    LYMPHOCYTES 31 12 - 49 %    MONOCYTES 11 5 - 13 %    EOSINOPHILS 4 0 - 7 %    BASOPHILS 1 0 - 1 %    IMMATURE GRANULOCYTES 0 0.0 - 0.5 %    ABS. NEUTROPHILS 2.3 1.8 - 8.0 K/UL    ABS. LYMPHOCYTES 1.4 0.8 - 3.5 K/UL    ABS. MONOCYTES 0.5 0.0 - 1.0 K/UL    ABS. EOSINOPHILS 0.2 0.0 - 0.4 K/UL    ABS. BASOPHILS 0.0 0.0 - 0.1 K/UL    ABS. IMM.  GRANS. 0.0 0.00 - 0.04 K/UL    DF AUTOMATED     METABOLIC PANEL, COMPREHENSIVE    Collection Time: 04/10/18  9:11 AM   Result Value Ref Range    Sodium 143 136 - 145 mmol/L    Potassium 5.1 3.5 - 5.1 mmol/L    Chloride 104 97 - 108 mmol/L    CO2 29 21 - 32 mmol/L    Anion gap 10 5 - 15 mmol/L    Glucose 99 65 - 100 mg/dL    BUN 41 (H) 6 - 20 MG/DL    Creatinine 6.06 (H) 0.70 - 1.30 MG/DL    BUN/Creatinine ratio 7 (L) 12 - 20      GFR est AA 11 (L) >60 ml/min/1.73m2    GFR est non-AA 9 (L) >60 ml/min/1.73m2    Calcium 9.0 8.5 - 10.1 MG/DL    Bilirubin, total 0.4 0.2 - 1.0 MG/DL    ALT (SGPT) 26 12 - 78 U/L    AST (SGOT) 23 15 - 37 U/L    Alk. phosphatase 54 45 - 117 U/L    Protein, total 6.9 6.4 - 8.2 g/dL    Albumin 3.6 3.5 - 5.0 g/dL    Globulin 3.3 2.0 - 4.0 g/dL    A-G Ratio 1.1 1.1 - 2.2     EKG, 12 LEAD, INITIAL    Collection Time: 04/10/18  9:23 AM   Result Value Ref Range    Ventricular Rate 52 BPM    Atrial Rate 52 BPM    P-R Interval 242 ms    QRS Duration 94 ms    Q-T Interval 438 ms    QTC Calculation (Bezet) 407 ms    Calculated P Axis 74 degrees    Calculated R Axis 57 degrees    Calculated T Axis 47 degrees    Diagnosis       Sinus bradycardia with 1st degree AV block  Septal infarct , age undetermined  Abnormal ECG  No previous ECGs available         Assessment:     Right inguinal hernia, incarcerated. Plan:     1. Discussed possibility of incarceration, strangulation, enlargement in size over time, and the risk of emergency surgery in the face of strangulation. Also discussed the risk of surgery including recurrence which can be up to 50% in the case of incisional or complex hernias, use of prosthetic materials (mesh) and the increased risk of infection, postoperative infection and the possible need for reoperation and removal of mesh if used, possibility of postoperative small bowel obstruction or ileus, and the risks of general anesthetic. The patient understands the risks; any and all questions were answered to the patient's satisfaction. 2. Patient has elected to proceed with surgical treatment. Procedure will be scheduled. Signed By: Dave Hopkins MD     April 10, 2018                     Surgery : robotic assisted, laparoscopic right inguinal hernia repair with mesh, possible left, possible open     Length:  1.5 hours    Diagnosis :     ICD-10-CM ICD-9-CM   1.  Right inguinal hernia K40.90 550.90       Anesthesia : general anesthesia    Surgery Type: Outpatient    Position:  supine    Hospital : [facility]    Blood thinner NO      Request Xi  Request ASAP

## 2018-04-06 NOTE — PROGRESS NOTES
1. Have you been to the ER, urgent care clinic since your last visit? Hospitalized since your last visit?no    2. Have you seen or consulted any other health care providers outside of the [address] [place] [address] since your last visit? Include any pap smears or colon screening.  no

## 2018-04-10 ENCOUNTER — HOSPITAL ENCOUNTER (OUTPATIENT)
Dept: PREADMISSION TESTING | Age: 83
Discharge: HOME | End: 2018-04-10
Payer: MEDICARE

## 2018-04-10 VITALS
DIASTOLIC BLOOD PRESSURE: 72 MMHG | HEIGHT: 66 IN | SYSTOLIC BLOOD PRESSURE: 167 MMHG | OXYGEN SATURATION: 98 % | BODY MASS INDEX: 26.2 KG/M2 | TEMPERATURE: 97.7 F | RESPIRATION RATE: 17 BRPM | WEIGHT: 163 LBS

## 2018-04-10 LAB
ALBUMIN SERPL-MCNC: 3.6 G/DL (ref 3.5–5)
ALBUMIN/GLOB SERPL: 1.1 {RATIO} (ref 1.1–2.2)
ALP SERPL-CCNC: 54 U/L (ref 45–117)
ALT SERPL-CCNC: 26 U/L (ref 12–78)
ANION GAP SERPL CALC-SCNC: 10 MMOL/L (ref 5–15)
AST SERPL-CCNC: 23 U/L (ref 15–37)
ATRIAL RATE: 52 BPM
BASOPHILS # BLD: 0 K/UL (ref 0–0.1)
BASOPHILS NFR BLD: 1 % (ref 0–1)
BILIRUB SERPL-MCNC: 0.4 MG/DL (ref 0.2–1)
BUN SERPL-MCNC: 41 MG/DL (ref 6–20)
BUN/CREAT SERPL: 7 (ref 12–20)
CALCIUM SERPL-MCNC: 9 MG/DL (ref 8.5–10.1)
CALCULATED P AXIS, ECG09: 85 DEGREES
CALCULATED R AXIS, ECG10: 56 DEGREES
CALCULATED T AXIS, ECG11: 48 DEGREES
CHLORIDE SERPL-SCNC: 104 MMOL/L (ref 97–108)
CO2 SERPL-SCNC: 29 MMOL/L (ref 21–32)
CREAT SERPL-MCNC: 6.06 MG/DL (ref 0.7–1.3)
DIAGNOSIS, 93000: NORMAL
DIFFERENTIAL METHOD BLD: ABNORMAL
EOSINOPHIL # BLD: 0.2 K/UL (ref 0–0.4)
EOSINOPHIL NFR BLD: 4 % (ref 0–7)
ERYTHROCYTE [DISTWIDTH] IN BLOOD BY AUTOMATED COUNT: 14.7 % (ref 11.5–14.5)
GLOBULIN SER CALC-MCNC: 3.3 G/DL (ref 2–4)
GLUCOSE SERPL-MCNC: 99 MG/DL (ref 65–100)
HCT VFR BLD AUTO: 41.5 % (ref 36.6–50.3)
HGB BLD-MCNC: 12.4 G/DL (ref 12.1–17)
IMM GRANULOCYTES # BLD: 0 K/UL (ref 0–0.04)
IMM GRANULOCYTES NFR BLD AUTO: 0 % (ref 0–0.5)
LYMPHOCYTES # BLD: 1.4 K/UL (ref 0.8–3.5)
LYMPHOCYTES NFR BLD: 31 % (ref 12–49)
MCH RBC QN AUTO: 26.1 PG (ref 26–34)
MCHC RBC AUTO-ENTMCNC: 29.9 G/DL (ref 30–36.5)
MCV RBC AUTO: 87.2 FL (ref 80–99)
MONOCYTES # BLD: 0.5 K/UL (ref 0–1)
MONOCYTES NFR BLD: 11 % (ref 5–13)
NEUTS SEG # BLD: 2.3 K/UL (ref 1.8–8)
NEUTS SEG NFR BLD: 53 % (ref 32–75)
NRBC # BLD: 0 K/UL (ref 0–0.01)
NRBC BLD-RTO: 0 PER 100 WBC
P-R INTERVAL, ECG05: 246 MS
PLATELET # BLD AUTO: 135 K/UL (ref 150–400)
PMV BLD AUTO: 10.4 FL (ref 8.9–12.9)
POTASSIUM SERPL-SCNC: 5.1 MMOL/L (ref 3.5–5.1)
PROT SERPL-MCNC: 6.9 G/DL (ref 6.4–8.2)
Q-T INTERVAL, ECG07: 448 MS
QRS DURATION, ECG06: 98 MS
QTC CALCULATION (BEZET), ECG08: 416 MS
RBC # BLD AUTO: 4.76 M/UL (ref 4.1–5.7)
SODIUM SERPL-SCNC: 143 MMOL/L (ref 136–145)
VENTRICULAR RATE, ECG03: 52 BPM
WBC # BLD AUTO: 4.4 K/UL (ref 4.1–11.1)

## 2018-04-10 PROCEDURE — 93005 ELECTROCARDIOGRAM TRACING: CPT

## 2018-04-10 PROCEDURE — 36415 COLL VENOUS BLD VENIPUNCTURE: CPT | Performed by: SURGERY

## 2018-04-10 PROCEDURE — 80053 COMPREHEN METABOLIC PANEL: CPT | Performed by: SURGERY

## 2018-04-10 PROCEDURE — 85025 COMPLETE CBC W/AUTO DIFF WBC: CPT | Performed by: SURGERY

## 2018-04-10 NOTE — PERIOP NOTES
Pt's daughter Elizabet Bradford and patient Gilbert Burgos shared that the morning of surgery(Ap16)  Mr Saqib Adam is scheduled for his regular M-W-F dialysis treatment where he normally runs on dialysis for 3-3.5 hours at [facility] dialysis      During PAT interview,  Mr Saqib Adam and his daughter-in-law(cayden James stated that the plan was for  him to only run for half of is dialysis treatment the morning before surgery as they were told by the surgeon's office to be at [facility] by 0830 on Ap 16. After the PAT interview, I spoke with Dr Mercy Lowe) to share the plan reported by the patient \"to only receive half of dialysis run before surgery\". Dr Barbara Carias requested that patient receive his entire dialysis time before coming to surgery, especially since the patient would not have had dialysis since Friday. I later phoned Mrs. Ever Golden his daughter-in-law(jean) to ask who instructed them to cut his dialysis treatment time by half, she stated that the decision to cut dialysis time was hers & Mr. Saqib Adam. They needed to be at hospital by 0830. Mrs. Saqib Adam seemed very concerned that I was asking her about who made the decision to half dialysis time. Spoke with Israel Phillips (charge nurse) at [facility]. Israel Phillips stated they had not been notified of this plan to half dialysis time on April 16.   Mrs. Saqib Adam (Nancy) states she had told someone at [facility] this plan(she did not know the name of who she spoke with.)    Spoke with [place] at Dr Daren Garcia office -  Informed her of anesthesia's concern about patient only receiving half of a dialysis treatment before surgery and that Dr Barbara Carias (anesthesia) wants patient  to have his entire scheduled dialysis treatment the morning before he reports  For his procedure on Ap 16.     Maine was going to report Dr Esteban Lynn request for patient to have his entire dialysis treatment before coming to hospital to Dr Tonio Clark and Blanca/Dr Tonio Clark to follow up with patient & dgter-in-law Shanta Lin.

## 2018-04-10 NOTE — PERIOP NOTES
Pt is scheduled to go to dialysis the morning before surgery(4/16 @ 04:45am) to run for 1/2 of his usual dialysis time time  Instructed to take his Toprol XL after dialysis with a sip of water and then ask anesthesia if he should take his amlodipine when he gets in pre-op  Pt is 1080 ml oral fluid restriction daily

## 2018-04-10 NOTE — PERIOP NOTES
[facility] [address]    MAIN OR [phone]    MAIN PRE OP [phone]    AMBULATORY PRE OP [phone]    PRE-ADMISSION TESTING [phone]       Surgery Date:   4/16/2018        Is surgery arrival time given by surgeon? NO  If NO, [facility] will call you between 3 and 7pm the day before your surgery with your arrival time. (If your surgery is on a Monday, we will call you the Friday before.)    Call [phone] after 7pm Monday-Friday if you did not receive your arrival time.     Answers to Common Questions   When You  Arrive   Arrive at the [facility] [address] [facility] on the day of your surgery  Have your insurance card, photo ID, and any copayment (if needed)     Food   and   Drink   NO food or drink after midnight the night before surgery    This means NO water, gum, mints, coffee, juice, etc.  No alcohol (beer, wine, liquor) 24 hours before and after surgery     Medicine to   TAKE   Morning of Surgery   MEDICATIONS TO TAKE THE MORNING OF SURGERY WITH A SIP OF WATER:   Take after dialysis but before surgery with sip of water:  Metoprolol     Bring your amlodipine to hospital  & ask anesthesia if you should take it with a sip of water before surgery   Medicine  To  STOP   FOR PAIN   NO Non-Steroidal Anti-Inflammatory Drugs (NSAIDs:   for example, Ibuprofen (Advil, Motrin), Naproxen (Aleve)   STOP herbal supplements and vitamins 1 week before surgery   You can take Tylenol - follow instructions on the bottle     Blood  Thinners    If you take Aspirin, Plavix, Coumadin, blood-thinning or anti-clot medicine, talk to your cardiologist & for  instructions from the doctor who told you to take that medicine    CALL  [address] Wear loose, comfortable clothes   Wear glasses instead of contacts  [employer] money, jewelry and valuables at home   No make-up, particularly mascara, the day of surgery   REMOVE ALL piercings, rings, and jewelry - leave at home   Wear hair loose or down; no pony-tails, buns, or metal hair clips    BATHING   Follow all special bath instructions (for total joint replacement, spine and bowel surgeries.)   If you shower the morning of surgery, please do not apply any lotions, powders, or deodorants afterwards. Do not shave or trim anywhere 24 hours before surgery. Going Home  or  Spending the Night    SAME-DAY SURGERY: You must have a responsible adult drive you home and stay with you 24 hours after surgery   ADMITS: If your doctor is keeping you into the hospital after surgery, leave personal belongings/luggage in your car until you have a hospital room number. Hospital discharge time is 12 noon  Drivers must be here before 12 noon unless you are told differently         Follow all instructions so your surgery wont be cancelled. Please, be on time. If a situation occurs and you are delayed the day of surgery, call [phone] or [phone]. If your physical condition changes (like a fever, cold, flu, etc.) call your surgeon as soon as possible. The [facility] staff can be reached at [phone]. OTHER SPECIAL INSTRUCTIONS:  none    The patient  & Jelly Ravi in law) were contacted  in person. They  verbalize  understanding of all instructions and does not  need reinforcement.

## 2018-04-11 NOTE — PERIOP NOTES
Called dialysis center for office note. Notified of change in patient surgery time to 1300 so as not to interfere with dialysis.

## 2018-04-13 ENCOUNTER — ANESTHESIA EVENT (OUTPATIENT)
Dept: SURGERY | Age: 83
End: 2018-04-13
Payer: MEDICARE

## 2018-04-16 ENCOUNTER — HOSPITAL ENCOUNTER (OUTPATIENT)
Age: 83
Setting detail: OUTPATIENT SURGERY
Discharge: HOME | End: 2018-04-16
Attending: SURGERY | Admitting: SURGERY
Payer: MEDICARE

## 2018-04-16 ENCOUNTER — ANESTHESIA (OUTPATIENT)
Dept: SURGERY | Age: 83
End: 2018-04-16
Payer: MEDICARE

## 2018-04-16 VITALS
TEMPERATURE: 97.9 F | WEIGHT: 165 LBS | DIASTOLIC BLOOD PRESSURE: 41 MMHG | HEIGHT: 66 IN | OXYGEN SATURATION: 99 % | SYSTOLIC BLOOD PRESSURE: 151 MMHG | HEART RATE: 56 BPM | BODY MASS INDEX: 26.52 KG/M2 | RESPIRATION RATE: 17 BRPM

## 2018-04-16 DIAGNOSIS — K40.90 RIGHT INGUINAL HERNIA: Primary | ICD-10-CM

## 2018-04-16 LAB
ANION GAP SERPL CALC-SCNC: 9 MMOL/L (ref 5–15)
BUN SERPL-MCNC: 28 MG/DL (ref 6–20)
BUN/CREAT SERPL: 6 (ref 12–20)
CALCIUM SERPL-MCNC: 8.5 MG/DL (ref 8.5–10.1)
CHLORIDE SERPL-SCNC: 104 MMOL/L (ref 97–108)
CO2 SERPL-SCNC: 29 MMOL/L (ref 21–32)
CREAT SERPL-MCNC: 4.4 MG/DL (ref 0.7–1.3)
GLUCOSE SERPL-MCNC: 106 MG/DL (ref 65–100)
POTASSIUM SERPL-SCNC: 4.5 MMOL/L (ref 3.5–5.1)
SODIUM SERPL-SCNC: 142 MMOL/L (ref 136–145)

## 2018-04-16 PROCEDURE — 76010000875 HC OR TIME 1.5 TO 2HR INTENSV - TIER 2: Performed by: SURGERY

## 2018-04-16 PROCEDURE — 74011250636 HC RX REV CODE- 250/636: Performed by: ANESTHESIOLOGY

## 2018-04-16 PROCEDURE — 77030002933 HC SUT MCRYL J&J -A: Performed by: SURGERY

## 2018-04-16 PROCEDURE — 77030011640 HC PAD GRND REM COVD -A: Performed by: SURGERY

## 2018-04-16 PROCEDURE — 74011250636 HC RX REV CODE- 250/636

## 2018-04-16 PROCEDURE — 77030035045 HC TRCR ENDOSC VRSPRT BLDLSS COVD -B: Performed by: SURGERY

## 2018-04-16 PROCEDURE — 76060000034 HC ANESTHESIA 1.5 TO 2 HR: Performed by: SURGERY

## 2018-04-16 PROCEDURE — 74011250636 HC RX REV CODE- 250/636: Performed by: SURGERY

## 2018-04-16 PROCEDURE — 77030008684 HC TU ET CUF COVD -B: Performed by: ANESTHESIOLOGY

## 2018-04-16 PROCEDURE — 77030018673: Performed by: SURGERY

## 2018-04-16 PROCEDURE — C1781 MESH (IMPLANTABLE): HCPCS | Performed by: SURGERY

## 2018-04-16 PROCEDURE — 77030026438 HC STYL ET INTUB CARD -A: Performed by: ANESTHESIOLOGY

## 2018-04-16 PROCEDURE — 80048 BASIC METABOLIC PNL TOTAL CA: CPT | Performed by: ANESTHESIOLOGY

## 2018-04-16 PROCEDURE — 77030032490 HC SLV COMPR SCD KNE COVD -B: Performed by: SURGERY

## 2018-04-16 PROCEDURE — 77030031139 HC SUT VCRL2 J&J -A: Performed by: SURGERY

## 2018-04-16 PROCEDURE — 77030010507 HC ADH SKN DERMBND J&J -B: Performed by: SURGERY

## 2018-04-16 PROCEDURE — 76210000006 HC OR PH I REC 0.5 TO 1 HR: Performed by: SURGERY

## 2018-04-16 PROCEDURE — 77030022704 HC SUT VLOC COVD -B: Performed by: SURGERY

## 2018-04-16 PROCEDURE — 77030016151 HC PROTCTR LNS DFOG COVD -B: Performed by: SURGERY

## 2018-04-16 PROCEDURE — 77030018836 HC SOL IRR NACL ICUM -A: Performed by: SURGERY

## 2018-04-16 PROCEDURE — 77030037032 HC INSRT SCIS CLICKLLINE DISP STOR -B: Performed by: SURGERY

## 2018-04-16 PROCEDURE — C9290 INJ, BUPIVACAINE LIPOSOME: HCPCS | Performed by: SURGERY

## 2018-04-16 PROCEDURE — 77030035048 HC TRCR ENDOSC OPTCL COVD -B: Performed by: SURGERY

## 2018-04-16 PROCEDURE — 36415 COLL VENOUS BLD VENIPUNCTURE: CPT | Performed by: ANESTHESIOLOGY

## 2018-04-16 PROCEDURE — 77030020703 HC SEAL CANN DISP INTU -B: Performed by: SURGERY

## 2018-04-16 PROCEDURE — 77030035277 HC OBTRTR BLDELSS DISP INTU -B: Performed by: SURGERY

## 2018-04-16 PROCEDURE — 77030033188 HC TBNG FLTRD BIIFUR DISP CNMD -C: Performed by: SURGERY

## 2018-04-16 PROCEDURE — 74011000250 HC RX REV CODE- 250

## 2018-04-16 PROCEDURE — 76210000020 HC REC RM PH II FIRST 0.5 HR: Performed by: SURGERY

## 2018-04-16 DEVICE — HYDROPHILIC ANATOMICAL MESH,RIGHT SIDE, POLYESTER
Type: IMPLANTABLE DEVICE | Site: INGUINAL | Status: FUNCTIONAL
Brand: PARIETEX

## 2018-04-16 RX ORDER — SODIUM CHLORIDE 9 MG/ML
50 INJECTION, SOLUTION INTRAVENOUS CONTINUOUS
Status: DISCONTINUED | OUTPATIENT
Start: 2018-04-16 | End: 2018-04-16

## 2018-04-16 RX ORDER — DEXAMETHASONE SODIUM PHOSPHATE 4 MG/ML
INJECTION, SOLUTION INTRA-ARTICULAR; INTRALESIONAL; INTRAMUSCULAR; INTRAVENOUS; SOFT TISSUE AS NEEDED
Status: DISCONTINUED | OUTPATIENT
Start: 2018-04-16 | End: 2018-04-16 | Stop reason: HOSPADM

## 2018-04-16 RX ORDER — CEFAZOLIN SODIUM/WATER 2 G/20 ML
SYRINGE (ML) INTRAVENOUS
Status: COMPLETED
Start: 2018-04-16 | End: 2018-04-16

## 2018-04-16 RX ORDER — HYDROMORPHONE HYDROCHLORIDE 2 MG/ML
INJECTION, SOLUTION INTRAMUSCULAR; INTRAVENOUS; SUBCUTANEOUS AS NEEDED
Status: DISCONTINUED | OUTPATIENT
Start: 2018-04-16 | End: 2018-04-16 | Stop reason: HOSPADM

## 2018-04-16 RX ORDER — DIPHENHYDRAMINE HYDROCHLORIDE 50 MG/ML
12.5 INJECTION, SOLUTION INTRAMUSCULAR; INTRAVENOUS AS NEEDED
Status: DISCONTINUED | OUTPATIENT
Start: 2018-04-16 | End: 2018-04-16 | Stop reason: HOSPADM

## 2018-04-16 RX ORDER — CISATRACURIUM BESYLATE 2 MG/ML
INJECTION, SOLUTION INTRAVENOUS AS NEEDED
Status: DISCONTINUED | OUTPATIENT
Start: 2018-04-16 | End: 2018-04-16 | Stop reason: HOSPADM

## 2018-04-16 RX ORDER — SODIUM CHLORIDE, SODIUM LACTATE, POTASSIUM CHLORIDE, CALCIUM CHLORIDE 600; 310; 30; 20 MG/100ML; MG/100ML; MG/100ML; MG/100ML
125 INJECTION, SOLUTION INTRAVENOUS CONTINUOUS
Status: DISCONTINUED | OUTPATIENT
Start: 2018-04-16 | End: 2018-04-16 | Stop reason: HOSPADM

## 2018-04-16 RX ORDER — NEOSTIGMINE METHYLSULFATE 1 MG/ML
INJECTION INTRAVENOUS AS NEEDED
Status: DISCONTINUED | OUTPATIENT
Start: 2018-04-16 | End: 2018-04-16 | Stop reason: HOSPADM

## 2018-04-16 RX ORDER — FLUMAZENIL 0.1 MG/ML
0.2 INJECTION INTRAVENOUS
Status: DISCONTINUED | OUTPATIENT
Start: 2018-04-16 | End: 2018-04-16 | Stop reason: HOSPADM

## 2018-04-16 RX ORDER — LIDOCAINE HYDROCHLORIDE 10 MG/ML
0.1 INJECTION, SOLUTION EPIDURAL; INFILTRATION; INTRACAUDAL; PERINEURAL AS NEEDED
Status: DISCONTINUED | OUTPATIENT
Start: 2018-04-16 | End: 2018-04-16 | Stop reason: HOSPADM

## 2018-04-16 RX ORDER — OXYCODONE AND ACETAMINOPHEN 5; 325 MG/1; MG/1
TABLET ORAL
Qty: 15 TAB | Refills: 0 | Status: SHIPPED | OUTPATIENT
Start: 2018-04-16 | End: 2019-09-13 | Stop reason: ALTCHOICE

## 2018-04-16 RX ORDER — GLYCOPYRROLATE 0.2 MG/ML
INJECTION INTRAMUSCULAR; INTRAVENOUS AS NEEDED
Status: DISCONTINUED | OUTPATIENT
Start: 2018-04-16 | End: 2018-04-16 | Stop reason: HOSPADM

## 2018-04-16 RX ORDER — SODIUM CHLORIDE 9 MG/ML
50 INJECTION, SOLUTION INTRAVENOUS CONTINUOUS
Status: DISCONTINUED | OUTPATIENT
Start: 2018-04-16 | End: 2018-04-16 | Stop reason: HOSPADM

## 2018-04-16 RX ORDER — ONDANSETRON 2 MG/ML
INJECTION INTRAMUSCULAR; INTRAVENOUS AS NEEDED
Status: DISCONTINUED | OUTPATIENT
Start: 2018-04-16 | End: 2018-04-16 | Stop reason: HOSPADM

## 2018-04-16 RX ORDER — FENTANYL CITRATE 50 UG/ML
INJECTION, SOLUTION INTRAMUSCULAR; INTRAVENOUS AS NEEDED
Status: DISCONTINUED | OUTPATIENT
Start: 2018-04-16 | End: 2018-04-16 | Stop reason: HOSPADM

## 2018-04-16 RX ORDER — NALOXONE HYDROCHLORIDE 0.4 MG/ML
0.2 INJECTION, SOLUTION INTRAMUSCULAR; INTRAVENOUS; SUBCUTANEOUS
Status: DISCONTINUED | OUTPATIENT
Start: 2018-04-16 | End: 2018-04-16 | Stop reason: HOSPADM

## 2018-04-16 RX ORDER — MIDAZOLAM HYDROCHLORIDE 1 MG/ML
2 INJECTION, SOLUTION INTRAMUSCULAR; INTRAVENOUS
Status: DISCONTINUED | OUTPATIENT
Start: 2018-04-16 | End: 2018-04-16 | Stop reason: HOSPADM

## 2018-04-16 RX ORDER — PROPOFOL 10 MG/ML
INJECTION, EMULSION INTRAVENOUS AS NEEDED
Status: DISCONTINUED | OUTPATIENT
Start: 2018-04-16 | End: 2018-04-16 | Stop reason: HOSPADM

## 2018-04-16 RX ORDER — CEFAZOLIN SODIUM/WATER 2 G/20 ML
2 SYRINGE (ML) INTRAVENOUS ONCE
Status: COMPLETED | OUTPATIENT
Start: 2018-04-16 | End: 2018-04-16

## 2018-04-16 RX ORDER — ONDANSETRON 4 MG/1
4 TABLET, ORALLY DISINTEGRATING ORAL
Qty: 30 TAB | Refills: 0 | Status: SHIPPED | OUTPATIENT
Start: 2018-04-16 | End: 2020-05-08

## 2018-04-16 RX ORDER — HYDROMORPHONE HYDROCHLORIDE 2 MG/ML
.25-1 INJECTION, SOLUTION INTRAMUSCULAR; INTRAVENOUS; SUBCUTANEOUS
Status: DISCONTINUED | OUTPATIENT
Start: 2018-04-16 | End: 2018-04-16 | Stop reason: HOSPADM

## 2018-04-16 RX ADMIN — FENTANYL CITRATE 50 MCG: 50 INJECTION, SOLUTION INTRAMUSCULAR; INTRAVENOUS at 12:13

## 2018-04-16 RX ADMIN — SODIUM CHLORIDE: 900 INJECTION, SOLUTION INTRAVENOUS at 13:21

## 2018-04-16 RX ADMIN — FENTANYL CITRATE 50 MCG: 50 INJECTION, SOLUTION INTRAMUSCULAR; INTRAVENOUS at 12:11

## 2018-04-16 RX ADMIN — ONDANSETRON 4 MG: 2 INJECTION INTRAMUSCULAR; INTRAVENOUS at 13:02

## 2018-04-16 RX ADMIN — NEOSTIGMINE METHYLSULFATE 2 MG: 1 INJECTION INTRAVENOUS at 13:25

## 2018-04-16 RX ADMIN — GLYCOPYRROLATE 0.2 MG: 0.2 INJECTION INTRAMUSCULAR; INTRAVENOUS at 13:25

## 2018-04-16 RX ADMIN — HYDROMORPHONE HYDROCHLORIDE 0.5 MG: 2 INJECTION, SOLUTION INTRAMUSCULAR; INTRAVENOUS; SUBCUTANEOUS at 13:08

## 2018-04-16 RX ADMIN — FENTANYL CITRATE 50 MCG: 50 INJECTION, SOLUTION INTRAMUSCULAR; INTRAVENOUS at 12:09

## 2018-04-16 RX ADMIN — FENTANYL CITRATE 50 MCG: 50 INJECTION, SOLUTION INTRAMUSCULAR; INTRAVENOUS at 12:06

## 2018-04-16 RX ADMIN — SODIUM CHLORIDE 50 ML/HR: 900 INJECTION, SOLUTION INTRAVENOUS at 12:00

## 2018-04-16 RX ADMIN — HYDROMORPHONE HYDROCHLORIDE 0.25 MG: 2 INJECTION, SOLUTION INTRAMUSCULAR; INTRAVENOUS; SUBCUTANEOUS at 14:20

## 2018-04-16 RX ADMIN — DEXAMETHASONE SODIUM PHOSPHATE 4 MG: 4 INJECTION, SOLUTION INTRA-ARTICULAR; INTRALESIONAL; INTRAMUSCULAR; INTRAVENOUS; SOFT TISSUE at 13:02

## 2018-04-16 RX ADMIN — PROPOFOL 150 MG: 10 INJECTION, EMULSION INTRAVENOUS at 12:13

## 2018-04-16 RX ADMIN — CISATRACURIUM BESYLATE 2 MG: 2 INJECTION, SOLUTION INTRAVENOUS at 13:06

## 2018-04-16 RX ADMIN — CISATRACURIUM BESYLATE 14 MG: 2 INJECTION, SOLUTION INTRAVENOUS at 12:13

## 2018-04-16 RX ADMIN — Medication 2 G: at 12:06

## 2018-04-16 NOTE — IP AVS SNAPSHOT
[address] 
[phone] Patient: Salma Chew MRN: [id number] :[id number] About your hospitalization You were admitted on:  2018 You last received care in the:  [facility] SURGERY You were discharged on:  2018 Why you were hospitalized Your primary diagnosis was:  Not on File Follow-up Information Follow up With Details Comments Contact Info Larry He MD   [address] [id number] 
[phone] Your Scheduled Appointments 2018  2:00 PM EDT  
POST OP 10 MIN with Court Wilson MD  
[address]  
[phone] Discharge Orders None A check nicolás indicates which time of day the medication should be taken. My Medications START taking these medications Instructions Each Dose to Equal  
 Morning Noon Evening Bedtime  
 oxyCODONE-acetaminophen 5-325 mg per tablet Commonly known as:  PERCOCET Your last dose was: Your next dose is:    
   
   
 1 to 2 tablets every 4 hours as needed for pain CONTINUE taking these medications Instructions Each Dose to Equal  
 Morning Noon Evening Bedtime  
 allopurinol 100 mg tablet Commonly known as:  Enedina Adam Your last dose was: Your next dose is: Take 100 mg by mouth Three (3) times a week.  100 mg  
    
   
   
   
  
 amLODIPine 10 mg tablet Commonly known as:  [place] Sheeba Your last dose was: Your next dose is: Take 1 Tab by mouth daily. Indications: hypertension 10 mg  
    
   
   
   
  
 atorvastatin 80 mg tablet Commonly known as:  LIPITOR Your last dose was: Your next dose is: Take 80 mg by mouth nightly. 80 mg  
    
   
   
   
  
 ECOTRIN 325 mg tablet Generic drug:  aspirin delayed-release Your last dose was: Your next dose is: Take 325 mg by mouth daily. Indications: myocardial infarction prevention 325 mg  
    
   
   
   
  
 lisinopril 20 mg tablet Commonly known as:  Mandi Dowdy Your last dose was: Your next dose is: Take 1 Tab by mouth daily. 20 mg  
    
   
   
   
  
 metoprolol succinate 25 mg XL tablet Commonly known as:  TOPROL-XL Your last dose was: Your next dose is: TAKE 1 TABLET BY MOUTH EVERY DAY  
     
   
   
   
  
 RENVELA 800 mg Tab tab Generic drug:  sevelamer carbonate Your last dose was: Your next dose is: Take 1,600 mg by mouth three (3) times daily (with meals). 1600 mg  
    
   
   
   
  
 tamsulosin 0.4 mg capsule Commonly known as:  FLOMAX Your last dose was: Your next dose is: TAKE 1 CAPSULE BY MOUTH EVERY DAY  
     
   
   
   
  
 VIRT-CAPS 1 mg capsule Generic drug:  b complex-vitamin c-folic acid Your last dose was: Your next dose is:    
   
   
 TK ONE C PO  DAILY Where to Get Your Medications Information on where to get these meds will be given to you by the nurse or doctor. ! Ask your nurse or doctor about these medications  
  oxyCODONE-acetaminophen 5-325 mg per tablet Opioid Education Prescription Opioids: What You Need to Know: 
 
Prescription opioids can be used to help relieve moderate-to-severe pain and are often prescribed following a surgery or injury, or for certain health conditions. These medications can be an important part of treatment but also come with serious risks. Opioids are strong pain medicines.  Examples include hydrocodone, oxycodone, fentanyl, and morphine. Heroin is an example of an illegal opioid. It is important to work with your health care provider to make sure you are getting the safest, most effective care. WHAT ARE THE RISKS AND SIDE EFFECTS OF OPIOID USE? Prescription opioids carry serious risks of addiction and overdose, especially with prolonged use. An opioid overdose, often marked by slow breathing, can cause sudden death. The use of prescription opioids can have a number of side effects as well, even when taken as directed. · Tolerance-meaning you might need to take more of a medication for the same pain relief · Physical dependence-meaning you have symptoms of withdrawal when the medication is stopped. Withdrawal symptoms can include nausea, sweating, chills, diarrhea, stomach cramps, and muscle aches. Withdrawal can last up to several weeks, depending on which drug you took and how long you took it. · Increased sensitivity to pain · Constipation · Nausea, vomiting, and dry mouth · Sleepiness and dizziness · Confusion · Depression · Low levels of testosterone that can result in lower sex drive, energy, and strength · Itching and sweating RISKS ARE GREATER WITH:      
· History of drug misuse, substance use disorder, or overdose · Mental health conditions (such as depression or anxiety) · Sleep apnea · Older age (72 years or older) · Pregnancy Avoid alcohol while taking prescription opioids. Also, unless specifically advised by your health care provider, medications to avoid include: · Benzodiazepines (such as Xanax or Valium) · Muscle relaxants (such as Soma or Flexeril) · Hypnotics (such as Ambien or Lunesta) · Other prescription opioids KNOW YOUR OPTIONS Talk to your health care provider about ways to manage your pain that don't involve prescription opioids. Some of these options may actually work better and have fewer risks and side effects. Options may include: · Pain relievers such as acetaminophen, ibuprofen, and naproxen · Some medications that are also used for depression or seizures · Physical therapy and exercise · Counseling to help patients learn how to cope better with triggers of pain and stress. · Application of heat or cold compress · Massage therapy · Relaxation techniques Be Informed Make sure you know the name of your medication, how much and how often to take it, and its potential risks & side effects. IF YOU ARE PRESCRIBED OPIOIDS FOR PAIN: 
· Never take opioids in greater amounts or more often than prescribed. Remember the goal is not to be pain-free but to manage your pain at a tolerable level. · Follow up with your primary care provider to: · Work together to create a plan on how to manage your pain. · Talk about ways to help manage your pain that don't involve prescription opioids. · Talk about any and all concerns and side effects. · Help prevent misuse and abuse. · Never sell or share prescription opioids · Help prevent misuse and abuse. · Store prescription opioids in a secure place and out of reach of others (this may include visitors, children, friends, and family). · Safely dispose of unused/unwanted prescription opioids: Find your community drug take-back program or your pharmacy mail-back program, or flush them down the toilet, following guidance from the Food and Drug Administration (www.fda.gov/Drugs/ResourcesForYou). · Visit www.cdc.gov/drugoverdose to learn about the risks of opioid abuse and overdose. · If you believe you may be struggling with addiction, tell your health care provider and ask for guidance or call [employer] at 2-524-134-ZGEP. Discharge Instructions Hernia Repair: What to Expect at Home Your Recovery You are likely to have pain for the next few days.  You may also feel like you have the flu, and you may have a low fever and feel tired and nauseated. This is common. You should feel better after a few days and will probably feel much better in 7 days. For several weeks you may feel twinges or pulling in the hernia repair when you move. You may have some bruising on the scrotum and along the penis. This is normal. Men will need to wear a jockstrap or briefs, not boxers, for scrotal support for several days after a groin (inguinal) hernia repair. Stylechi bicycle shorts may provide good support. This care sheet gives you a general idea about how long it will take for you to recover. But each person recovers at a different pace. Follow the steps below to get better as quickly as possible. How can you care for yourself at home? Activity ? · Rest when you feel tired. Getting enough sleep will help you recover. ? · Try to walk each day. Start by walking a little more than you did the day before. Bit by bit, increase the amount you walk. Walking boosts blood flow and helps prevent pneumonia and constipation. ? · Avoid strenuous activities, such as biking, jogging, weight lifting, or aerobic exercise, until your doctor says it is okay. ? · Avoid lifting anything that would make you strain. This may include heavy grocery bags and milk containers, a heavy briefcase or backpack, cat litter or dog food bags, a vacuum , or a child. ? · You may drive when you are no longer taking pain medicine and can quickly move your foot from the gas pedal to the brake. You must also be able to sit comfortably for a long period of time, even if you do not plan to go far. You might get caught in traffic. ? · Most people are able to return to work within 1 to 2 weeks after surgery. ? · You may shower 24 to 48 hours after surgery, if your doctor okays it. Pat the cut (incision) dry. Do not take a bath for the first 2 weeks, or until your doctor tells you it is okay. ? · Your doctor will tell you when you can have sex again. Diet ? · You can eat your normal diet. If your stomach is upset, try bland, low-fat foods like plain rice, broiled chicken, toast, and yogurt. ? · Drink plenty of fluids (unless your doctor tells you not to). ? · You may notice that your bowel movements are not regular right after your surgery. This is common. Avoid constipation and straining with bowel movements. You may want to take a fiber supplement every day. If you have not had a bowel movement after a couple of days, ask your doctor about taking a mild laxative. Medicines ? · Your doctor will tell you if and when you can restart your medicines. He or she will also give you instructions about taking any new medicines. ? · If you take blood thinners, such as warfarin (Coumadin), clopidogrel (Plavix), or aspirin, be sure to talk to your doctor. He or she will tell you if and when to start taking those medicines again. Make sure that you understand exactly what your doctor wants you to do. ? · Be safe with medicines. Take pain medicines exactly as directed. ¨ If the doctor gave you a prescription medicine for pain, take it as prescribed. ¨ If you are not taking a prescription pain medicine, take an over-the-counter medicine such as acetaminophen (Tylenol), ibuprofen (Advil, Motrin), or naproxen (Aleve). Read and follow all instructions on the label. ¨ Do not take two or more pain medicines at the same time unless the doctor told you to. Many pain medicines have acetaminophen, which is Tylenol. Too much acetaminophen (Tylenol) can be harmful. ? · If your doctor prescribed antibiotics, take them as directed. Do not stop taking them just because you feel better. You need to take the full course of antibiotics. ? · If you think your pain medicine is making you sick to your stomach: 
¨ Take your medicine after meals (unless your doctor has told you not to). ¨ Ask your doctor for a different pain medicine. Incision care ? · If you have strips of tape on the cut (incision) the doctor made, leave the tape on for a week or until it falls off.  
? · If you have staples closing the cut, you will need to visit your doctor in 1 to 2 weeks to have them removed. ? · Wash the area daily with warm, soapy water and pat it dry. Follow-up care is a key part of your treatment and safety. Be sure to make and go to all appointments, and call your doctor if you are having problems. It's also a good idea to know your test results and keep a list of the medicines you take. When should you call for help? Call 911 anytime you think you may need emergency care. For example, call if: 
? · You passed out (lost consciousness). ? · You are short of breath. ?Call your doctor now or seek immediate medical care if: 
? · You have pain that does not get better after you take pain medicine. ? · You are sick to your stomach and cannot keep fluids down. ? · You have signs of a blood clot in your leg (called a deep vein thrombosis), such as: 
¨ Pain in your calf, back of the knee, thigh, or groin. ¨ Redness and swelling in your leg or groin. ? · You cannot pass stools or gas. ? · Bright red blood has soaked through the bandage over your incision. ? · You have loose stitches, or your incision comes open. ? · You have signs of infection, such as: 
¨ Increased pain, swelling, warmth, or redness. ¨ Red streaks leading from the incision. ¨ Pus draining from the incision. ¨ A fever. ? Watch closely for any changes in your health, and be sure to contact your doctor if you have any problems. Where can you learn more? Go to http://gustavo-eb.info/. Enter H549 in the search box to learn more about \"Hernia Repair: What to Expect at Home. \" Current as of: May 12, 2017 Content Version: 11.4 © 4154-4622 Healthwise[employer].  Care instructions adapted under license by Blanca Harper (which disclaims liability or warranty for this information). If you have questions about a medical condition or this instruction, always ask your healthcare professional. [address] any warranty or liability for your use of this information. Kelsie Zamudio MD, PhD, [facility] General Surgery at [address] [place] [address], [address] [place], [facility] 
[phone] Fax [phone] DISCHARGE SUMMARY from your Nurse The following personal items collected during your admission are returned to you:  
Dental Appliance: Dental Appliances: At home, Lowers, Uppers Vision: Visual Aid: Glasses, At home Hearing Aid:   
Jewelry: Jewelry: None Clothing: Clothing:  (Clothing to locker) Other Valuables: Other Valuables: None Valuables sent to safe:   
 
PATIENT INSTRUCTIONS: 
 
After general anesthesia or intravenous sedation, for 24 hours or while taking prescription Narcotics: · Limit your activities · Do not drive and operate hazardous machinery · Do not make important personal or business decisions · Do  not drink alcoholic beverages · If you have not urinated within 8 hours after discharge, please contact your surgeon on call. Report the following to your surgeon: 
· Excessive pain, swelling, redness or odor of or around the surgical area · Temperature over 100.5 · Nausea and vomiting lasting longer than 4 hours or if unable to take medications · Any signs of decreased circulation or nerve impairment to extremity: change in color, persistent  numbness, tingling, coldness or increase pain · Any questions [address] Breathing deep and coughing are very important exercises to do after surgery. Deep breathing and coughing open the little air tubes and air sacks in your lungs. You take deep breaths every day.   You may not even notice - it is just something you do when you sigh or yawn. It is a natural exercise you do to keep these air passages open. After surgery, take deep breaths and cough, on purpose. Coughing and deep breathing help prevent bronchitis and pneumonia after surgery. If you had chest or belly surgery, use a pillow as a \"hug aneta\" and hold it tightly to your chest or belly when you cough. DIRECTIONS: 
6. Take 10 to 15 slow deep breaths every hour while awake. 7. Breathe in deeply, and hold it for 2 seconds. 8. Exhale slowly through puckered lips, like blowing up a balloon. 9. After every 4th or 5th deep breath, hug your pillow to your chest or belly and give a hard, deep cough. Yes, it will probably hurt. But doing this exercise is very important part of healing after surgery. Take your pain medicine to help you do this exercise without too much pain. IF YOU HAVE BEEN DIAGNOSED WITH SLEEP APNEA, PLEASE USE YOUR SLEEP APNEA DEVICE OR CPAP MACHINE WHEN YOU INTEND TO NAP AFTER TAKING PAIN MEDICATION. Ankle Pumps Ankle pumps increase the circulation of oxygenated blood to your lower extremities and decrease your risk for circulation problems such as blood clots. They also stretch the muscles, tendons and ligaments in your foot and ankle, and prevent joint contracture in the ankle and foot, especially after surgeries on the legs. It is important to do ankle pump exercises regularly after surgery because immobility increases your risk for developing a blood clot. Your doctor may also have you take an Aspirin for the next few days as well. If your doctor did not ask you to take an Aspirin, consult with him before starting Aspirin therapy on your own. Slowly point your foot forward, feeling the muscles on the top of your lower leg stretch, and hold this position for 5 seconds.   
 
          
 
Next, pull your foot back toward you as far as possible, stretching the calf muscles, and hold that position for 5 seconds. Repeat with the other foot. Perform 10 repetitions every hour while awake for both ankles if possible (down and then up with the foot once is one repetition). You should feel gentle stretching of the muscles in your lower leg when doing this exercise. If you feel pain, or your range of motion is limited, don't  Push too hard. Only go the limit your joint and muscles will let you go. If you have increasing pain, progressively worsening leg warmth or swelling, STOP the exercise and call your doctor. Below is information about the medications your doctor is prescribing after your visit: 
 
Other information in your discharge envelope: 
[]     PRESCRIPTIONS []     PHYSICAL THERAPY PRESCRIPTION 
[]     APPOINTMENT CARDS []     Regional Anesthesia Pamphlet for block or block with On-Q Catheter from Anesthesia Service []     Medical device information sheets/pamphlets from their   
[]     School/work excuse note. []     /parent work excuse note. These are general instructions for a healthy lifestyle: *  Please give a list of your current medications to your Primary Care Provider. *  Please update this list whenever your medications are discontinued, doses are 
    changed, or new medications (including over-the-counter products) are added. *  Please carry medication information at all times in case of emergency situations. About Smoking No smoking / No tobacco products / Avoid exposure to second hand smoke Surgeon General's Warning:  Quitting smoking now greatly reduces serious risk to your health. Obesity, smoking, and sedentary lifestyle greatly increases your risk for illness and disease. A healthy diet, regular physical exercise & weight monitoring are important for maintaining a healthy lifestyle. Congestive Heart Failure You may be retaining fluid if you have a history of heart failure or if you experience any of the following symptoms:  Weight gain of 3 pounds or more overnight or 5 pounds in a week, increased swelling in our hands or feet or shortness of breath while lying flat in bed. Please call your doctor as soon as you notice any of these symptoms; do not wait until your next office visit. Recognize signs and symptoms of STROKE: 
F - face looks uneven A - arms unable to move or move even S - speech slurred or non-existent T - time-call 911 as soon as signs and symptoms begin-DO NOT go Back to bed or wait to see if you get better-TIME IS BRAIN. Warning signs of HEART ATTACK Call 911 if you have these symptoms · Chest discomfort. Most heart attacks involve discomfort in the center of the chest that lasts more than a few minutes, or that goes away and comes back. It can feel like uncomfortable pressure, squeezing, fullness, or pain. · Discomfort in other areas of the upper body. Symptoms can include pain or discomfort in one or both Arms, the back, neck, jaw, or stomach. ·  Shortness of breath with or without chest discomfort. · Other signs may include breaking out in a cold sweat, nausea, or lightheadedness Don't wait more than five minutes to call 911 - MINUTES MATTER! Fast action can save your life. Calling 911 is almost always the fastest way to get lifesaving treatment. Emergency Medical Services staff can begin treatment when they arrive - up to an hour sooner than if someone gets to the hospital by car. KRYS SMITH MEDICATION AND SIDE EFFECT GUIDE The [facility] EFFECT GUIDE was provided to the PATIENT AND CARE PROVIDER. Information provided includes instruction about drug purpose and common side effects for the following medications: 
 
oxyCODONE-acetaminophen 5-325 mg per tablet Commonly known as: PERCOCET  
   
Your last dose was:   
   
Your next dose is:   
   
   
 1 to 2 tablets every 4 hours as needed for pain ACO Transitions of Care Introducing [employer] [address] offers a voluntary care coordination program to provide high quality service and care to [facility] fee-for-service beneficiaries. Denisederickvinod Brodyrj was designed to help you enhance your health and well-being through the following services: ? Transitions of Care  support for individuals who are transitioning from one care setting to another (example: Hospital to home). ? Chronic and Complex Care Coordination  support for individuals and caregivers of those with serious or chronic illnesses or with more than one chronic (ongoing) condition and those who take a number of different medications. If you meet specific medical criteria, a [address] [place] [address] Rd may call you directly to coordinate your care with your primary care physician and your other care providers. For questions about the [facility] programs, please, contact your physicians office. For general questions or additional information about Accountable Care Organizations: 
Please visit www.medicare.gov/acos. html or call 1-800-MEDICARE (3-120.527.6588) TTY users should call 0-563.580.5918. Introducing [facility]! Dear Amparo Bauman: Thank you for requesting a [employer] account. Our records indicate that you already have an active [employer] account. You can access your account anytime at [email or web address] Did you know that you can access your hospital and ER discharge instructions at any time in [employer]? You can also review all of your test results from your hospital stay or ER visit. Additional Information If you have questions, please visit the Frequently Asked Questions section of the [employer] website at [email or web address] Remember, MyChart is NOT to be used for urgent needs. For medical emergencies, dial 911. Now available from your iPhone and Android! Introducing Cesar Allan As a [employer] patient, I wanted to make you aware of our electronic visit tool called Cesar Allan. [employer] 24/7 allows you to connect within minutes with a medical provider 24 hours a day, seven days a week via a mobile device or tablet or logging into a secure website from your computer. You can access Cesar Allan from anywhere in the [place]. A virtual visit might be right for you when you have a simple condition and feel like you just dont want to get out of bed, or cant get away from work for an appointment, when your regular [employer] provider is not available (evenings, weekends or holidays), or when youre out of town and need minor care. Electronic visits cost only $49 and if the [employer] 24/7 provider determines a prescription is needed to treat your condition, one can be electronically transmitted to a nearby pharmacy*. Please take a moment to enroll today if you have not already done so. The enrollment process is free and takes just a few minutes. To enroll, please download the [employer] 24/7 jada to your tablet or phone, or visit [email or web address] to enroll on your computer. And, as an [address] [place] [address] patient with a [employer] account, the results of your visits will be scanned into your electronic medical record and your primary care provider will be able to view the scanned results. We urge you to continue to see your regular [employer] provider for your ongoing medical care. And while your primary care provider may not be the one available when you seek a Cesar Allan virtual visit, the peace of mind you get from getting a real diagnosis real time can be priceless.    
 
For more information on Cesar Florinkatyafin, view our Frequently Asked Questions (FAQs) at [email or web address]. Sincerely, 
 
Juan Caruso MD 
Chief Medical Officer Leighton Stephenson *:  certain medications cannot be prescribed via [facility] Providers Seen During Your Hospitalization Provider Specialty Primary office phone Elizabeth Parrish MD General Surgery [phone] Your Primary Care Physician (PCP) Primary Care Physician Office Phone Office Fax May [facility]ly [facility], [facility] [phone] You are allergic to the following Allergen Reactions Anesthetics - Amide Type Nausea and Vomiting ANESTHESIA. . Nausea and vomit Pt reports that he gets the side effect of nausea & vomiting after anesthesia but has never had any reaction to anesthesia other than nausea and vomiting. Denies rash, difficulty breathing or any other reaction Anesthetics - Lori Type- Parabens Nausea and Vomiting ANESTHESIA. . Nausea and vomit Pt reports that he gets the side effect of nausea & vomiting after anesthesia but has never had any reaction to anesthesia other than nausea and vomiting. Denies rash, difficulty breathing or any other reaction Other Medication Unknown (comments) ANESTHESIA. . Nausea and vomit   Pt reports that he gets the side effect of nausea & vomiting after anesthesia  but has never had any reaction to anesthesia other than nausea and vomiting. Denies rash, difficulty breathing or any other reaction Recent Documentation Height Weight BMI Smoking Status 1.676 m 74.8 kg 26.63 kg/m2 Former Smoker Emergency Contacts Name Discharge Info Relation Home Work Mobile [facility] CAREGIVER [3] Son [22] [phone] Joy  67. CAREGIVER [3] Other Relative [6] [phone] Patient Belongings The following personal items are in your possession at time of discharge: Dental Appliances: At home, Lowers, Uppers  Visual Aid: Glasses, At home      Home Medications: None   Jewelry: None  Clothing:  (Clothing to locker)    Other Valuables: None Please provide this summary of care documentation to your next provider. Signatures-by signing, you are acknowledging that this After Visit Summary has been reviewed with you and you have received a copy. Patient Signature:  ____________________________________________________________ Date:  ____________________________________________________________  
  
[facility] Provider Signature:  ____________________________________________________________ Date:  ____________________________________________________________

## 2018-04-16 NOTE — ANESTHESIA PREPROCEDURE EVALUATION
Anesthetic History   No history of anesthetic complications            Review of Systems / Medical History  Patient summary reviewed, nursing notes reviewed and pertinent labs reviewed    Pulmonary  Within defined limits                 Neuro/Psych   Within defined limits           Cardiovascular    Hypertension: well controlled          CAD, CABG and hyperlipidemia    Exercise tolerance: >4 METS  Comments: CABG 1991   GI/Hepatic/Renal         Renal disease: ESRD      Comments: ESRD: approx 2 years  BPH Endo/Other             Other Findings              Physical Exam    Airway  Mallampati: II    Neck ROM: normal range of motion   Mouth opening: Normal     Cardiovascular    Rhythm: regular  Rate: normal         Dental    Dentition: Full upper dentures and Full lower dentures     Pulmonary  Breath sounds clear to auscultation               Abdominal         Other Findings            Anesthetic Plan    ASA: 3  Anesthesia type: general          Induction: Intravenous  Anesthetic plan and risks discussed with: Patient and Healthcare power of       Informed consent obtained.

## 2018-04-16 NOTE — H&P (VIEW-ONLY)
Surgery Consult    Subjective:     Lam Banks is a 81 y.o.  male who was referred for evaluation of  right inguinal hernia. Patient has a long standing history of a RIH. It never caused him pain until about 2 weeks ago. Since that time he has increasing pain, anorexia, bloating and nausea. Pain is sharp and rated as moderate. Lump is not reducible. Patient does not have symptoms of chronic constipation, chronic cough, difficulty urinating. Patient does not have a history of previous hernia surgery.     Patient is on HD for renal failure MWF    Patient Active Problem List    Diagnosis Date Noted    Bradycardia 12/21/2015    ESRD (end stage renal disease) ([facility] [address] 12/14/2015    Undiagnosed cardiac murmurs 05/14/2014    Coronary atherosclerosis of native coronary artery     HLD (hyperlipidemia)     Postsurgical aortocoronary bypass status     Essential hypertension     Mitral regurgitation     BPH (benign prostatic hyperplasia) 03/14/2013    Lower urinary tract symptoms (LUTS) 03/14/2013    Elevated prostate specific antigen (PSA) 03/14/2013    Renal insufficiency 03/14/2013     Past Medical History:   Diagnosis Date    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 Tarah Carmen Bradycardia 12/21/2015    12/15 lopressor d/neela by Dr Lam Avalos, was taking 100 mg will start 25 mg and watch for tamara     CAD (coronary artery disease)     Chronic kidney disease     on dialysis  M-W F    Coronary atherosclerosis of native coronary artery     Stable symptoms    Essential hypertension, benign     Controlled    Hypercholesteremia     Mitral valve disorders(424.0)     Nausea & vomiting     Other and unspecified hyperlipidemia     3/12 Low density lipoproteins (LDLs) are at goal, triglycerides are at goal, high density lipoproteins (HDLs) are at goal    Postsurgical aortocoronary bypass status 12    Prostate disease      Past Surgical History:   Procedure Laterality Date    CARDIAC SURG PROCEDURE UNLIST  1991    HX CORONARY ARTERY BYPASS GRAFT  1991    x 1    VASCULAR SURGERY PROCEDURE UNLIST  2016    dialysis fistula left upper arm      Family History   Problem Relation Age of Onset    Cancer Mother     Heart Disease Neg Hx     Heart Attack Neg Hx     Sudden Death Neg Hx     Stroke Neg Hx       Social History   Substance Use Topics    Smoking status: Former Smoker     Quit date: 11/1/1983    Smokeless tobacco: Never Used    Alcohol use No      Current Outpatient Prescriptions   Medication Sig    VIRT-CAPS 1 mg capsule TK ONE C PO  DAILY    aspirin delayed-release (ECOTRIN) 325 mg tablet Take 325 mg by mouth daily. Indications: myocardial infarction prevention    metoprolol succinate (TOPROL-XL) 25 mg XL tablet TAKE 1 TABLET BY MOUTH EVERY DAY    tamsulosin (FLOMAX) 0.4 mg capsule TAKE 1 CAPSULE BY MOUTH EVERY DAY    lisinopril (PRINIVIL, ZESTRIL) 20 mg tablet Take 1 Tab by mouth daily.  amLODIPine (NORVASC) 10 mg tablet Take 1 Tab by mouth daily. Indications: hypertension    sevelamer carbonate (RENVELA) 800 mg tab tab Take 1,600 mg by mouth three (3) times daily (with meals).  allopurinol (ZYLOPRIM) 100 mg tablet Take 100 mg by mouth Three (3) times a week. Mondays Wednesday Fridays    atorvastatin (LIPITOR) 80 mg tablet Take 80 mg by mouth nightly.  furosemide (LASIX) 80 mg tablet TAKE 1 TABLET BY MOUTH DAILY     No current facility-administered medications for this visit. Allergies   Allergen Reactions    Other Medication Unknown (comments)     ANESTHESIA. . Nausea and vomit   Pt reports that he gets the side effect of nausea & vomiting after anesthesia  but has never had any reaction to anesthesia other than nausea and vomiting.   Denies rash, difficulty breathing or any other reaction        Review of Systems:  A comprehensive review of systems was negative except for that written in the History of Present Illness. Objective:     Blood pressure 126/46, pulse 64, temperature 98.3 °F (36.8 °C), temperature source Oral, height 5' 6\" (1.676 m), weight 161 lb (73 kg). Physical Exam:  General:  Alert, cooperative, no distress, appears stated age. Eyes:  Conjunctivae/corneas clear. , EOMs intact. Lungs:   Clear to auscultation bilaterally. Heart:  Regular rate and rhythm, S1, S2 normal, no murmur, click, rub or gallop. Abdomen:   Soft, non-tender. Bowel sounds normal. No masses,  No organomegaly. : Moderate size, RIH that is only partially reducible, no palpable LIH       Labs:   Recent Results (from the past 24 hour(s))   CBC WITH AUTOMATED DIFF    Collection Time: 04/10/18  9:11 AM   Result Value Ref Range    WBC 4.4 4.1 - 11.1 K/uL    RBC 4.76 4.10 - 5.70 M/uL    HGB 12.4 12.1 - 17.0 g/dL    HCT 41.5 36.6 - 50.3 %    MCV 87.2 80.0 - 99.0 FL    MCH 26.1 26.0 - 34.0 PG    MCHC 29.9 (L) 30.0 - 36.5 g/dL    RDW 14.7 (H) 11.5 - 14.5 %    PLATELET 173 (L) 977 - 400 K/uL    MPV 10.4 8.9 - 12.9 FL    NRBC 0.0 0  WBC    ABSOLUTE NRBC 0.00 0.00 - 0.01 K/uL    NEUTROPHILS 53 32 - 75 %    LYMPHOCYTES 31 12 - 49 %    MONOCYTES 11 5 - 13 %    EOSINOPHILS 4 0 - 7 %    BASOPHILS 1 0 - 1 %    IMMATURE GRANULOCYTES 0 0.0 - 0.5 %    ABS. NEUTROPHILS 2.3 1.8 - 8.0 K/UL    ABS. LYMPHOCYTES 1.4 0.8 - 3.5 K/UL    ABS. MONOCYTES 0.5 0.0 - 1.0 K/UL    ABS. EOSINOPHILS 0.2 0.0 - 0.4 K/UL    ABS. BASOPHILS 0.0 0.0 - 0.1 K/UL    ABS. IMM.  GRANS. 0.0 0.00 - 0.04 K/UL    DF AUTOMATED     METABOLIC PANEL, COMPREHENSIVE    Collection Time: 04/10/18  9:11 AM   Result Value Ref Range    Sodium 143 136 - 145 mmol/L    Potassium 5.1 3.5 - 5.1 mmol/L    Chloride 104 97 - 108 mmol/L    CO2 29 21 - 32 mmol/L    Anion gap 10 5 - 15 mmol/L    Glucose 99 65 - 100 mg/dL    BUN 41 (H) 6 - 20 MG/DL    Creatinine 6.06 (H) 0.70 - 1.30 MG/DL    BUN/Creatinine ratio 7 (L) 12 - 20      GFR est AA 11 (L) >60 ml/min/1.73m2    GFR est non-AA 9 (L) >60 ml/min/1.73m2    Calcium 9.0 8.5 - 10.1 MG/DL    Bilirubin, total 0.4 0.2 - 1.0 MG/DL    ALT (SGPT) 26 12 - 78 U/L    AST (SGOT) 23 15 - 37 U/L    Alk. phosphatase 54 45 - 117 U/L    Protein, total 6.9 6.4 - 8.2 g/dL    Albumin 3.6 3.5 - 5.0 g/dL    Globulin 3.3 2.0 - 4.0 g/dL    A-G Ratio 1.1 1.1 - 2.2     EKG, 12 LEAD, INITIAL    Collection Time: 04/10/18  9:23 AM   Result Value Ref Range    Ventricular Rate 52 BPM    Atrial Rate 52 BPM    P-R Interval 242 ms    QRS Duration 94 ms    Q-T Interval 438 ms    QTC Calculation (Bezet) 407 ms    Calculated P Axis 74 degrees    Calculated R Axis 57 degrees    Calculated T Axis 47 degrees    Diagnosis       Sinus bradycardia with 1st degree AV block  Septal infarct , age undetermined  Abnormal ECG  No previous ECGs available         Assessment:     Right inguinal hernia, incarcerated. Plan:     1. Discussed possibility of incarceration, strangulation, enlargement in size over time, and the risk of emergency surgery in the face of strangulation. Also discussed the risk of surgery including recurrence which can be up to 50% in the case of incisional or complex hernias, use of prosthetic materials (mesh) and the increased risk of infection, postoperative infection and the possible need for reoperation and removal of mesh if used, possibility of postoperative small bowel obstruction or ileus, and the risks of general anesthetic. The patient understands the risks; any and all questions were answered to the patient's satisfaction. 2. Patient has elected to proceed with surgical treatment. Procedure will be scheduled. Signed By: Ross Truong MD     April 10, 2018                     Surgery : robotic assisted, laparoscopic right inguinal hernia repair with mesh, possible left, possible open     Length:  1.5 hours    Diagnosis :     ICD-10-CM ICD-9-CM   1.  Right inguinal hernia K40.90 550.90       Anesthesia : general anesthesia    Surgery Type: Outpatient    Position:  supine    Hospital : [facility]    Blood thinner NO      Request Xi  Request ASAP

## 2018-04-16 NOTE — DISCHARGE INSTRUCTIONS
Hernia Repair: What to Expect at [address] are likely to have pain for the next few days. You may also feel like you have the flu, and you may have a low fever and feel tired and nauseated. This is common. You should feel better after a few days and will probably feel much better in 7 days. For several weeks you may feel twinges or pulling in the hernia repair when you move. You may have some bruising on the scrotum and along the penis. This is normal. Men will need to wear a jockstrap or briefs, not boxers, for scrotal support for several days after a groin (inguinal) hernia repair. BTIGdex bicycle shorts may provide good support. This care sheet gives you a general idea about how long it will take for you to recover. But each person recovers at a different pace. Follow the steps below to get better as quickly as possible. How can you care for yourself at home? Activity  ? · Rest when you feel tired. Getting enough sleep will help you recover. ? · Try to walk each day. Start by walking a little more than you did the day before. Bit by bit, increase the amount you walk. Walking boosts blood flow and helps prevent pneumonia and constipation. ? · Avoid strenuous activities, such as biking, jogging, weight lifting, or aerobic exercise, until your doctor says it is okay. ? · Avoid lifting anything that would make you strain. This may include heavy grocery bags and milk containers, a heavy briefcase or backpack, cat litter or dog food bags, a vacuum , or a child. ? · You may drive when you are no longer taking pain medicine and can quickly move your foot from the gas pedal to the brake. You must also be able to sit comfortably for a long period of time, even if you do not plan to go far. You might get caught in traffic. ? · Most people are able to return to work within 1 to 2 weeks after surgery. ? · You may shower 24 to 48 hours after surgery, if your doctor okays it.  Pat the cut (incision) dry. Do not take a bath for the first 2 weeks, or until your doctor tells you it is okay. ? · Your doctor will tell you when you can have sex again. Diet  ? · You can eat your normal diet. If your stomach is upset, try bland, low-fat foods like plain rice, broiled chicken, toast, and yogurt. ? · Drink plenty of fluids (unless your doctor tells you not to). ? · You may notice that your bowel movements are not regular right after your surgery. This is common. Avoid constipation and straining with bowel movements. You may want to take a fiber supplement every day. If you have not had a bowel movement after a couple of days, ask your doctor about taking a mild laxative. Medicines  ? · Your doctor will tell you if and when you can restart your medicines. He or she will also give you instructions about taking any new medicines. ? · If you take blood thinners, such as warfarin (Coumadin), clopidogrel (Plavix), or aspirin, be sure to talk to your doctor. He or she will tell you if and when to start taking those medicines again. Make sure that you understand exactly what your doctor wants you to do. ? · Be safe with medicines. Take pain medicines exactly as directed. ¨ If the doctor gave you a prescription medicine for pain, take it as prescribed. ¨ If you are not taking a prescription pain medicine, take an over-the-counter medicine such as acetaminophen (Tylenol), ibuprofen (Advil, Motrin), or naproxen (Aleve). Read and follow all instructions on the label. ¨ Do not take two or more pain medicines at the same time unless the doctor told you to. Many pain medicines have acetaminophen, which is Tylenol. Too much acetaminophen (Tylenol) can be harmful. ? · If your doctor prescribed antibiotics, take them as directed. Do not stop taking them just because you feel better. You need to take the full course of antibiotics. ?  · If you think your pain medicine is making you sick to your stomach:  ¨ Take your medicine after meals (unless your doctor has told you not to). ¨ Ask your doctor for a different pain medicine. Incision care  ? · If you have strips of tape on the cut (incision) the doctor made, leave the tape on for a week or until it falls off.   ? · If you have staples closing the cut, you will need to visit your doctor in 1 to 2 weeks to have them removed. ? · Wash the area daily with warm, soapy water and pat it dry. Follow-up care is a key part of your treatment and safety. Be sure to make and go to all appointments, and call your doctor if you are having problems. It's also a good idea to know your test results and keep a list of the medicines you take. When should you call for help? Call 911 anytime you think you may need emergency care. For example, call if:  ? · You passed out (lost consciousness). ? · You are short of breath. ?Call your doctor now or seek immediate medical care if:  ? · You have pain that does not get better after you take pain medicine. ? · You are sick to your stomach and cannot keep fluids down. ? · You have signs of a blood clot in your leg (called a deep vein thrombosis), such as:  ¨ Pain in your calf, back of the knee, thigh, or groin. ¨ Redness and swelling in your leg or groin. ? · You cannot pass stools or gas. ? · Bright red blood has soaked through the bandage over your incision. ? · You have loose stitches, or your incision comes open. ? · You have signs of infection, such as:  ¨ Increased pain, swelling, warmth, or redness. ¨ Red streaks leading from the incision. ¨ Pus draining from the incision. ¨ A fever. ? Watch closely for any changes in your health, and be sure to contact your doctor if you have any problems. Where can you learn more? Go to http://gustavo-eb.info/. Enter W331 in the search box to learn more about \"Hernia Repair: What to Expect at Home. \"  Current as of:  May 12, 2017  Content Version: 11.4  © 5150-8743 [employer], [employer]. Care instructions adapted under license by [employer] (which disclaims liability or warranty for this information). If you have questions about a medical condition or this instruction, always ask your healthcare professional. [address] any warranty or liability for your use of this information. Erica Montana MD, PhD, FACS  General Surgery at [facility], [address], [facility], [address]  [phone]  Fax [phone]    DISCHARGE SUMMARY from your Nurse    The following personal items collected during your admission are returned to you:   Dental Appliance: Dental Appliances: At home, Lowers, Uppers  Vision: Visual Aid: Glasses, At home  Hearing Aid:    Jewelry: Jewelry: None  Clothing: Clothing:  (Clothing to locker)  Other Valuables: Other Valuables: None  Valuables sent to safe:      PATIENT INSTRUCTIONS:    After general anesthesia or intravenous sedation, for 24 hours or while taking prescription Narcotics:  · Limit your activities  · Do not drive and operate hazardous machinery  · Do not make important personal or business decisions  · Do  not drink alcoholic beverages  · If you have not urinated within 8 hours after discharge, please contact your surgeon on call. Report the following to your surgeon:  · Excessive pain, swelling, redness or odor of or around the surgical area  · Temperature over 100.5  · Nausea and vomiting lasting longer than 4 hours or if unable to take medications  · Any signs of decreased circulation or nerve impairment to extremity: change in color, persistent  numbness, tingling, coldness or increase pain  · Any questions    COUGH AND DEEP BREATHE    Breathing deep and coughing are very important exercises to do after surgery. Deep breathing and coughing open the little air tubes and air sacks in your lungs.       You take deep breaths every day. You may not even notice - it is just something you do when you sigh or yawn. It is a natural exercise you do to keep these air passages open. After surgery, take deep breaths and cough, on purpose. Coughing and deep breathing help prevent bronchitis and pneumonia after surgery. If you had chest or belly surgery, use a pillow as a \"hug aneta\" and hold it tightly to your chest or belly when you cough. DIRECTIONS:  6. Take 10 to 15 slow deep breaths every hour while awake. 7. Breathe in deeply, and hold it for 2 seconds. 8. Exhale slowly through puckered lips, like blowing up a balloon. 9. After every 4th or 5th deep breath, hug your pillow to your chest or belly and give a hard, deep cough. Yes, it will probably hurt. But doing this exercise is very important part of healing after surgery. Take your pain medicine to help you do this exercise without too much pain. IF YOU HAVE BEEN DIAGNOSED WITH SLEEP APNEA, PLEASE USE YOUR SLEEP APNEA DEVICE OR CPAP MACHINE WHEN YOU INTEND TO NAP AFTER TAKING PAIN MEDICATION. Ankle Pumps    Ankle pumps increase the circulation of oxygenated blood to your lower extremities and decrease your risk for circulation problems such as blood clots. They also stretch the muscles, tendons and ligaments in your foot and ankle, and prevent joint contracture in the ankle and foot, especially after surgeries on the legs. It is important to do ankle pump exercises regularly after surgery because immobility increases your risk for developing a blood clot. Your doctor may also have you take an Aspirin for the next few days as well. If your doctor did not ask you to take an Aspirin, consult with him before starting Aspirin therapy on your own. Slowly point your foot forward, feeling the muscles on the top of your lower leg stretch, and hold this position for 5 seconds.                   Next, pull your foot back toward you as far as possible, stretching the calf muscles, and hold that position for 5 seconds. Repeat with the other foot. Perform 10 repetitions every hour while awake for both ankles if possible (down and then up with the foot once is one repetition). You should feel gentle stretching of the muscles in your lower leg when doing this exercise. If you feel pain, or your range of motion is limited, don't  Push too hard. Only go the limit your joint and muscles will let you go. If you have increasing pain, progressively worsening leg warmth or swelling, STOP the exercise and call your doctor. Below is information about the medications your doctor is prescribing after your visit:    Other information in your discharge envelope:  []     PRESCRIPTIONS  []     PHYSICAL THERAPY PRESCRIPTION  []     APPOINTMENT CARDS  []     Regional Anesthesia Pamphlet for block or block with On-Q Catheter from Anesthesia Service  []     Medical device information sheets/pamphlets from their    []     School/work excuse note. []     /parent work excuse note. These are general instructions for a healthy lifestyle:    *  Please give a list of your current medications to your Primary Care Provider. *  Please update this list whenever your medications are discontinued, doses are      changed, or new medications (including over-the-counter products) are added. *  Please carry medication information at all times in case of emergency situations. About Smoking  No smoking / No tobacco products / Avoid exposure to second hand smoke    Surgeon General's Warning:  Quitting smoking now greatly reduces serious risk to your health. Obesity, smoking, and sedentary lifestyle greatly increases your risk for illness and disease. A healthy diet, regular physical exercise & weight monitoring are important for maintaining a healthy lifestyle.     Congestive Heart Failure  You may be retaining fluid if you have a history of heart failure or if you experience any of the following symptoms:  Weight gain of 3 pounds or more overnight or 5 pounds in a week, increased swelling in our hands or feet or shortness of breath while lying flat in bed. Please call your doctor as soon as you notice any of these symptoms; do not wait until your next office visit. Recognize signs and symptoms of STROKE:  F - face looks uneven  A - arms unable to move or move even  S - speech slurred or non-existent  T - time-call 911 as soon as signs and symptoms begin-DO NOT go         Back to bed or wait to see if you get better-TIME IS BRAIN. Warning signs of HEART ATTACK  Call 911 if you have these symptoms    · Chest discomfort. Most heart attacks involve discomfort in the center of the chest that lasts more than a few minutes, or that goes away and comes back. It can feel like uncomfortable pressure, squeezing, fullness, or pain. · Discomfort in other areas of the upper body. Symptoms can include pain or discomfort in one or both        Arms, the back, neck, jaw, or stomach. ·  Shortness of breath with or without chest discomfort. · Other signs may include breaking out in a cold sweat, nausea, or lightheadedness    Don't wait more than five minutes to call 911 - MINUTES MATTER! Fast action can save your life. Calling 911 is almost always the fastest way to get lifesaving treatment. Emergency Medical Services staff can begin treatment when they arrive - up to an hour sooner than if someone gets to the hospital by car. [facility] MEDICATION AND SIDE EFFECT GUIDE    [facility] MEDICATION AND SIDE EFFECT GUIDE was provided to the PATIENT AND CARE PROVIDER.   Information provided includes instruction about drug purpose and common side effects for the following medications:    oxyCODONE-acetaminophen 5-325 mg per tablet   Commonly known as: PERCOCET       Your last dose was:        Your next dose is:             1 to 2 tablets every 4 hours as needed for pain

## 2018-04-16 NOTE — BRIEF OP NOTE
BRIEF OPERATIVE NOTE    Date of Procedure: 4/16/2018   Preoperative Diagnosis: RIGHT INGUINAL HERNIA  Postoperative Diagnosis: S/A  Procedure(s):  DAVINCI LAPAROSCOPIC REPAIR RIGHT INGUINAL HERNIA WITH MESH, POSSIBLE LEFT  Surgeon(s) and Role:     * Radha Sears MD - Primary         Surgical Assistant: Timmy Ladd    Surgical Staff:  Circ-1: Ronnie Dallas RN  Circ-2: Mary Khan  Scrub Tech-1: Rahul Lake  Surg Asst-1: Duane Shirts, LPN  Float Staff: Bailey Dyer RN  Event Time In   Incision Start 1238   Incision Close      Anesthesia: General   Estimated Blood Loss: minimal  Specimens: * No specimens in log *   Findings: right indirect inguinal hernia  Complications: none  Implants:   Implant Name Type Inv.  Item Serial No.  Lot No. LRB No. Used Action   MESH BRISEIDA 6X4IN RT -- PARIETEX - SN/A Mesh MESH BRISEIDA 6X4IN RT -- PARIETEX N/A COVIDIEN  SURGICAL [id number] Right 1 Implanted

## 2018-04-16 NOTE — ANESTHESIA POSTPROCEDURE EVALUATION
Post-Anesthesia Evaluation and Assessment    Patient: Marquis Bliss MRN: [id number]  SSN: [id number]    YOB: 1935  Age: 81 y.o. Sex: male       Cardiovascular Function/Vital Signs  Visit Vitals    /41    Pulse (!) 56    Temp 36.6 °C (97.9 °F)    Resp 17    Ht 5' 6\" (1.676 m)    Wt 74.8 kg (165 lb)    SpO2 99%    BMI 26.63 kg/m2       Patient is status post general anesthesia for Procedure(s):  [address] [place] [address]. Nausea/Vomiting: None    Postoperative hydration reviewed and adequate. Pain:  Pain Scale 1: Numeric (0 - 10) (04/16/18 1421)  Pain Intensity 1: 8 (04/16/18 1421)   Managed    Neurological Status:   Neuro (WDL): Exceptions to WDL (04/16/18 1348)  Neuro  Neurologic State: Drowsy (04/16/18 1348)  LUE Motor Response: Purposeful (04/16/18 1348)  LLE Motor Response: Purposeful (04/16/18 1348)  RUE Motor Response: Purposeful (04/16/18 1348)  RLE Motor Response: Purposeful (04/16/18 1348)   At baseline    Mental Status and Level of Consciousness: Arousable    Pulmonary Status:   O2 Device: Room air (04/16/18 1348)   Adequate oxygenation and airway patent    Complications related to anesthesia: None    Post-anesthesia assessment completed.  No concerns    Signed By: Corinna Bennett MD     April 16, 2018

## 2018-04-16 NOTE — INTERVAL H&P NOTE
H&P Update:  Inderjit Rao was seen and examined. History and physical has been reviewed. The patient has been examined.  There have been no significant clinical changes since the completion of the originally dated History and Physical.    Signed By: Radha Sears MD     April 16, 2018 11:12 AM

## 2018-04-16 NOTE — PERIOP NOTES
Pink limb alert band and blue limb alert sleeve placed on left arm for dialysis shunt to [facility]SEM. No BP's or IV's sticks to left arm.

## 2018-04-18 NOTE — OP NOTES
[facility] [place] [facility]  OPERATIVE REPORT    Jeremy Vasquez  MR#: [id number]  : 1935  ACCOUNT #: [de-identified]   DATE OF SERVICE: 2018    PREOPERATIVE DIAGNOSIS:  Incarcerated right inguinal hernia. POSTOPERATIVE DIAGNOSIS:  Incarcerated right inguinal hernia. PROCEDURE PERFORMED:  Robotic-assisted laparoscopic right inguinal hernia repair with mesh. SURGEON:  Camilla Valera MD     ASSISTANT:  Ishmael Sotelo SA    ANESTHESIA:  General endotracheal.    ESTIMATED BLOOD LOSS:  Minimal.    TUBES AND DRAINS:  None. SPECIMENS:  None. COMPLICATIONS:  None apparent. IMPLANTS:  none     FINDINGS:  1.  Moderate sized indirect inguinal hernia on the right-hand side. 2.  No evidence of a left inguinal hernia. INDICATIONS FOR PROCEDURE:  The patient is an 81-year-old man with a longstanding history of a known right inguinal hernia. Over the past several weeks, the hernia has become more painful and has become visible and now difficult to reduce and now it cannot be reduced. The patient was taken to the operating room for surgical repair. DESCRIPTION OF PROCEDURE:  The patient was identified in the preoperative holding area and informed consent was obtained. He received preoperative antibiotics and the site was marked. He was then taken to the operating room and placed in supine position and underwent general endotracheal anesthesia without complication. His arms were then tucked and all pressure points were padded. Abdomen was prepped and draped in the usual sterile fashion. Timeout was performed to confirm the patient's name and that he was presenting for robotic-assisted laparoscopic right inguinal hernia repair, possible left. Once this was confirmed, entry into the abdomen was obtained in the left upper quadrant at the tip of his 11th rib. At this site, 3 mL of Exparel was injected in the subcutaneous space.   An 8 mm incision was made and an 8 mm robotic trocar containing a 5 mm 0-degree laparoscope was used to dissect through layers of the abdominal wall under direct laparoscopic vision. Entry into the abdomen was confirmed visually. Once in the abdomen, insufflation was provided through the trocar to a pressure of 15 mmHg. The patient tolerated insufflation well and there were no complications. A 360-degree evaluation of the abdomen was then performed from this trocar site. There were no peritoneal implants. The surface anatomy of the liver appeared normal.  Examination of the pelvis revealed a right inguinal hernia containing no visceral contents. The hernia had reduced spontaneously on induction of anesthesia. All of the small bowel was examined and it appeared viable. There was no visible hernia defect on the left-hand side. Two additional 8 mm trocars were then placed across the upper abdomen, both under direct laparoscopic vision. Once this was completed, the robotic bedside cart was docked to the trocars. I broke scrub and went to the console. Using a fenestrated grasper and scissors, I scored the peritoneum transversely across the abdomen just below the umbilicus. Blunt dissection was then used to create a preperitoneal plane from this peritoneal incision down to the pubic symphysis. The dissection was carried out to the posterior axillary line on either side. There was no direct hernia identified on the right-hand side or the left-hand side. There were no femoral or obturator hernias identified on either side. The spermatic cord on the right-hand side was identified. The large hernia sac on the cord was taken off of the cord with slow careful dissection. The vas deferens and testicular vessels were protected during the dissection. The hernia sac was reduced into the preperitoneal plane and then taken off of the cord structures all the way up to the level of the umbilicus.       Once this was completed, attention was focused on the left-hand side. There was no hernia sac on the cord on the left-hand side. Therefore, attention was focused back on the right-hand side. A Parietex anatomic mesh with lateral slits oriented to the right-hand side was inserted, wallpapered over the right myopectineal orifice. Once the mesh was smooth and flat over the right myopectineal orifice, the door in the mesh was closed surrounding the spermatic cord. The mesh was inspected to ensure it covered all potential hernia defects and that it was smooth and flat. 2 cm of mesh extended below the superior pubic rami to cover the potential femoral and obturator spaces. Once the mesh was in place, it was sutured to the abdominal wall with a 2-0 Stratafix suture. The suture was placed through the mesh, through Orissaare ligament, and then back out through the mesh. The suture was placed near the pubic symphysis. The suture was then carried up along the linea alba to the top edge of the mesh. Once this was completed, the peritoneum was reapproximated using a running 2-0 Stratafix suture. Insufflation was then vented through the 8 mm trocars and they were removed. The skin at all trocar sites was closed with 4-0 Monocryl and Dermabond. The patient was extubated in the room and taken to postanesthesia care unit in stable condition. Instrument and sponge counts were correct x2.       MD Cristela Pond / ABBEY  D: 04/18/2018 16:11     T: 04/18/2018 18:51  JOB #: [id number]

## 2018-05-02 DIAGNOSIS — I10 ESSENTIAL HYPERTENSION: ICD-10-CM

## 2018-05-02 RX ORDER — ATORVASTATIN CALCIUM 80 MG/1
80 TABLET, FILM COATED ORAL
Qty: 90 TAB | Refills: 2 | Status: SHIPPED | OUTPATIENT
Start: 2018-05-02 | End: 2022-09-09 | Stop reason: SDUPTHER

## 2018-05-02 RX ORDER — AMLODIPINE BESYLATE 10 MG/1
10 TABLET ORAL DAILY
Qty: 90 TAB | Refills: 2 | Status: SHIPPED | OUTPATIENT
Start: 2018-05-02 | End: 2019-07-18 | Stop reason: SDUPTHER

## 2018-05-02 NOTE — TELEPHONE ENCOUNTER
Requested Prescriptions     Signed Prescriptions Disp Refills    atorvastatin (LIPITOR) 80 mg tablet 90 Tab 2     Sig: Take 1 Tab by mouth nightly. Authorizing Provider: Jerman Shane     Ordering User: Kala Lara    amLODIPine (NORVASC) 10 mg tablet 90 Tab 2     Sig: Take 1 Tab by mouth daily.  Indications: hypertension     Authorizing Provider: Jerman Shane     Ordering User: Kala Lara    Per Dr. Domenic Gray verbal orders

## 2018-05-08 ENCOUNTER — OFFICE VISIT (OUTPATIENT)
Dept: SURGERY | Age: 83
End: 2018-05-08

## 2018-05-08 VITALS
HEART RATE: 62 BPM | HEIGHT: 66 IN | SYSTOLIC BLOOD PRESSURE: 157 MMHG | TEMPERATURE: 97.5 F | RESPIRATION RATE: 16 BRPM | WEIGHT: 161.5 LBS | DIASTOLIC BLOOD PRESSURE: 54 MMHG | BODY MASS INDEX: 25.96 KG/M2 | OXYGEN SATURATION: 98 %

## 2018-05-08 DIAGNOSIS — Z09 POSTOPERATIVE EXAMINATION: Primary | ICD-10-CM

## 2018-05-08 NOTE — PROGRESS NOTES
1. Have you been to the ER, urgent care clinic since your last visit? Hospitalized since your last visit? No    2. Have you seen or consulted any other health care providers outside of the [address] [place] [address] since your last visit? Include any pap smears or colon screening.  No

## 2018-05-08 NOTE — PROGRESS NOTES
Subjective:      Briseyda Ferraro is a 81 y.o. male presents for postop care 22 days following RIHR. Appetite is good. Eating a regular diet without difficulty. Bowel movements are constipated. The patient is voiding without difficulty. The patient is not having any pain. .    Objective:     Visit Vitals    /54 (BP 1 Location: Right arm, BP Patient Position: Sitting)    Pulse 62    Temp 97.5 °F (36.4 °C) (Oral)    Resp 16    Ht 5' 6\" (1.676 m)    Wt 161 lb 8 oz (73.3 kg)    SpO2 98%    BMI 26.07 kg/m2       General:  alert, cooperative, no distress, appears stated age   Abdomen: soft, bowel sounds active, non-tender   Incision:   healing well, no drainage, no erythema, no hernia, no seroma, no swelling, no dehiscence, incision well approximated     Assessment:     Doing well postoperatively. Plan:     1. Continue any current medications. 2. Wound care discussed. 3. Pt is to increase activities as tolerated. Yeny Pearce

## 2018-05-09 ENCOUNTER — OFFICE VISIT (OUTPATIENT)
Dept: CARDIOLOGY CLINIC | Age: 83
End: 2018-05-09

## 2018-05-09 VITALS
SYSTOLIC BLOOD PRESSURE: 150 MMHG | BODY MASS INDEX: 25.88 KG/M2 | DIASTOLIC BLOOD PRESSURE: 70 MMHG | HEART RATE: 64 BPM | HEIGHT: 66 IN | WEIGHT: 161 LBS

## 2018-05-09 DIAGNOSIS — R60.9 SWELLING: Primary | ICD-10-CM

## 2018-05-09 DIAGNOSIS — I25.10 ATHEROSCLEROSIS OF NATIVE CORONARY ARTERY OF NATIVE HEART WITHOUT ANGINA PECTORIS: ICD-10-CM

## 2018-05-09 DIAGNOSIS — I10 ESSENTIAL HYPERTENSION: ICD-10-CM

## 2018-05-09 DIAGNOSIS — E78.2 MIXED HYPERLIPIDEMIA: ICD-10-CM

## 2018-05-09 DIAGNOSIS — N18.6 ESRD (END STAGE RENAL DISEASE) (HCC): ICD-10-CM

## 2018-05-09 NOTE — MR AVS SNAPSHOT
[address] [facility] [address] [place] 
[phone] Patient: Vaibhav Madrigal MRN: [id number] :1935 Visit Information Date & Time Provider Department Dept. Phone Encounter #  
 2018  2:20 PM Tejas Giang MD [facility] David Brown [phone] [id number] Follow-up Instructions Return in about 6 months (around 2018). Your Appointments 2018 11:40 AM  
ESTABLISHED PATIENT with Tejas Giang MD  
[facility] ([facility]) Appt Note: 6 mo f/u per Dr. Mandi Johnson [address] [place] [address]  [address] [place]  
[address] [id number] Upcoming Health Maintenance Date Due DTaP/Tdap/Td series (1 - Tdap) 1956 ZOSTER VACCINE AGE 60> 10/27/1996 GLAUCOMA SCREENING Q2Y 2001 Pneumococcal 65+ High/Highest Risk (1 of 2 - PCV13) 2001 MEDICARE YEARLY EXAM 3/14/2018 Influenza Age 5 to Adult 2018 Allergies as of 2018  Review Complete On: 2018 By: Tejas Giang MD  
  
 Severity Noted Reaction Type Reactions Anesthetics - Amide Type  2018    Nausea and Vomiting ANESTHESIA. . Nausea and vomit Pt reports that he gets the side effect of nausea & vomiting after anesthesia but has never had any reaction to anesthesia other than nausea and vomiting. Denies rash, difficulty breathing or any other reaction Anesthetics - Lori Type- Parabens  2018    Nausea and Vomiting ANESTHESIA. . Nausea and vomit Pt reports that he gets the side effect of nausea & vomiting after anesthesia but has never had any reaction to anesthesia other than nausea and vomiting. Denies rash, difficulty breathing or any other reaction Other Medication  2013   Side Effect Unknown (comments) ANESTHESIA. . Nausea and vomit   Pt reports that he gets the side effect of nausea & vomiting after anesthesia  but has never had any reaction to anesthesia other than nausea and vomiting. Denies rash, difficulty breathing or any other reaction Current Immunizations  Never Reviewed No immunizations on file. Not reviewed this visit You Were Diagnosed With   
  
 Codes Comments Swelling    -  Primary ICD-10-CM: R60.9 ICD-9-CM: 437. 3 Atherosclerosis of native coronary artery of native heart without angina pectoris     ICD-10-CM: I25.10 ICD-9-CM: 414.01 Mixed hyperlipidemia     ICD-10-CM: E78.2 ICD-9-CM: 272.2 Essential hypertension     ICD-10-CM: I10 
ICD-9-CM: 401.9 [facility] disease) [facility]     ICD-10-CM: N18.6 ICD-9-CM: 658. 6 Vitals BP Pulse Height(growth percentile) Weight(growth percentile) BMI Smoking Status 150/70 (BP 1 Location: Right arm, BP Patient Position: Sitting) 64 5' 6\" (1.676 m) 161 lb (73 kg) 25.99 kg/m2 Former Smoker Vitals History BMI and BSA Data Body Mass Index Body Surface Area  
 25.99 kg/m 2 1.84 m 2 Preferred Pharmacy Pharmacy Name Phone [facility] [address]  [facility] [phone] Your Updated Medication List  
  
   
This list is accurate as of 5/9/18  2:36 PM.  Always use your most recent med list.  
  
  
  
  
 allopurinol 100 mg tablet Commonly known as:  Elkins Salvador Take 100 mg by mouth Three (3) times a week. Mondays Wednesday Fridays * amLODIPine 10 mg tablet Commonly known as:  Caralee Dupes TAKE 1 TABLET BY MOUTH DAILY FOR HIGH BLOOD PRESSURE  
  
 * amLODIPine 10 mg tablet Commonly known as:  Caralee Dupes Take 1 Tab by mouth daily. Indications: hypertension  
  
 atorvastatin 80 mg tablet Commonly known as:  LIPITOR Take 1 Tab by mouth nightly. ECOTRIN 325 mg tablet Generic drug:  aspirin delayed-release Take 325 mg by mouth daily. Indications: myocardial infarction prevention  
  
 lisinopril 20 mg tablet Commonly known as:  Velora Pb Take 1 Tab by mouth daily. metoprolol succinate 25 mg XL tablet Commonly known as:  TOPROL-XL  
TAKE 1 TABLET BY MOUTH EVERY DAY  
  
 ondansetron 4 mg disintegrating tablet Commonly known as:  ZOFRAN ODT Take 1 Tab by mouth every eight (8) hours as needed for Nausea. oxyCODONE-acetaminophen 5-325 mg per tablet Commonly known as:  PERCOCET  
1 to 2 tablets every 4 hours as needed for pain RENVELA 800 mg Tab tab Generic drug:  sevelamer carbonate Take 1,600 mg by mouth three (3) times daily (with meals). tamsulosin 0.4 mg capsule Commonly known as:  FLOMAX TAKE 1 CAPSULE BY MOUTH EVERY DAY  
  
 VIRT-CAPS 1 mg capsule Generic drug:  b complex-vitamin c-folic acid TK ONE C PO  DAILY * Notice: This list has 2 medication(s) that are the same as other medications prescribed for you. Read the directions carefully, and ask your doctor or other care provider to review them with you. Follow-up Instructions Return in about 6 months (around 11/9/2018). Introducing [facility]! Dear Leesa Bliss: Thank you for requesting a [employer] account. Our records indicate that you already have an active [employer] account. You can access your account anytime at [email or web address] [email or web address] Did you know that you can access your hospital and ER discharge instructions at any time in [employer]? You can also review all of your test results from your hospital stay or ER visit. Additional Information If you have questions, please visit the Frequently Asked Questions section of the [employer] website at [email or web address] [email or web address]/. Remember, [employer] is NOT to be used for urgent needs. For medical emergencies, dial 911. Now available from your iPhone and Android! Please provide this summary of care documentation to your next provider. Your primary care clinician is listed as Cayden Meyer. If you have any questions after today's visit, please call [phone].

## 2018-05-10 DIAGNOSIS — E78.2 MIXED HYPERLIPIDEMIA: ICD-10-CM

## 2018-05-16 RX ORDER — METOPROLOL SUCCINATE 25 MG/1
TABLET, EXTENDED RELEASE ORAL
Qty: 90 TAB | Refills: 2 | Status: SHIPPED | OUTPATIENT
Start: 2018-05-16 | End: 2019-01-20 | Stop reason: SDUPTHER

## 2018-05-16 RX ORDER — LISINOPRIL 20 MG/1
TABLET ORAL
Qty: 90 TAB | Refills: 2 | Status: SHIPPED | OUTPATIENT
Start: 2018-05-16 | End: 2019-02-25 | Stop reason: SDUPTHER

## 2018-05-16 NOTE — TELEPHONE ENCOUNTER
Requested Prescriptions     Signed Prescriptions Disp Refills    lisinopril (PRINIVIL, ZESTRIL) 20 mg tablet 90 Tab 2     Sig: TAKE 1 TABLET BY MOUTH EVERY DAY     Authorizing Provider: Charu Childress     Ordering User: Nadyne File    metoprolol succinate (TOPROL-XL) 25 mg XL tablet 90 Tab 2     Sig: TAKE 1 TABLET BY MOUTH EVERY DAY     Authorizing Provider: Charu Childress     Ordering User: Danny File    Per Dr. Anca Rm verbal orders

## 2018-07-29 DIAGNOSIS — I10 ESSENTIAL HYPERTENSION: ICD-10-CM

## 2018-07-31 RX ORDER — AMLODIPINE BESYLATE 10 MG/1
TABLET ORAL
Qty: 90 TAB | Refills: 0 | Status: SHIPPED | OUTPATIENT
Start: 2018-07-31 | End: 2018-08-22 | Stop reason: SDUPTHER

## 2018-07-31 NOTE — TELEPHONE ENCOUNTER
Requested Prescriptions     Signed Prescriptions Disp Refills    amLODIPine (NORVASC) 10 mg tablet 90 Tab 0     Sig: TAKE 1 TABLET BY MOUTH DAILY FOR HIGH BLOOD PRESSURE     Authorizing Provider: Prabha Hammond     Ordering User: Batool Ames       Per Dr. Satish Escalona  Date Time Provider Evette Turner   8/16/2018 11:40 AM Khai Andrea  [address]

## 2018-08-22 ENCOUNTER — OFFICE VISIT (OUTPATIENT)
Dept: CARDIOLOGY CLINIC | Age: 83
End: 2018-08-22

## 2018-08-22 VITALS
HEART RATE: 65 BPM | WEIGHT: 156 LBS | HEIGHT: 66 IN | BODY MASS INDEX: 25.07 KG/M2 | SYSTOLIC BLOOD PRESSURE: 140 MMHG | OXYGEN SATURATION: 96 % | DIASTOLIC BLOOD PRESSURE: 70 MMHG | RESPIRATION RATE: 18 BRPM

## 2018-08-22 DIAGNOSIS — I25.10 ATHEROSCLEROSIS OF NATIVE CORONARY ARTERY OF NATIVE HEART WITHOUT ANGINA PECTORIS: Primary | ICD-10-CM

## 2018-08-22 DIAGNOSIS — I10 ESSENTIAL HYPERTENSION: ICD-10-CM

## 2018-08-22 DIAGNOSIS — E78.2 MIXED HYPERLIPIDEMIA: ICD-10-CM

## 2018-08-22 DIAGNOSIS — R60.9 SWELLING: ICD-10-CM

## 2018-08-22 DIAGNOSIS — N18.6 ESRD (END STAGE RENAL DISEASE) (HCC): ICD-10-CM

## 2018-08-22 NOTE — MR AVS SNAPSHOT
[address] [facility] [address] [facility] 
[phone] Patient: Ngozi Baker MRN: [id number] :1935 Visit Information Date & Time Provider Department Dept. Phone Encounter #  
 2018  3:00 PM Braydon Rhodes MD [facility] Diane Jorgensen [phone] [id number] Follow-up Instructions Return in about 6 months (around 2019). Upcoming Health Maintenance Date Due DTaP/Tdap/Td series (1 - Tdap) 1956 ZOSTER VACCINE AGE 60> 10/27/1996 GLAUCOMA SCREENING Q2Y 2001 Pneumococcal 65+ High/Highest Risk (1 of 2 - PCV13) 2001 MEDICARE YEARLY EXAM 3/14/2018 Influenza Age 5 to Adult 2018 Allergies as of 2018  Review Complete On: 2018 By: Braydon Rhodes MD  
  
 Severity Noted Reaction Type Reactions Anesthetics - Amide Type  2018    Nausea and Vomiting ANESTHESIA. . Nausea and vomit Pt reports that he gets the side effect of nausea & vomiting after anesthesia but has never had any reaction to anesthesia other than nausea and vomiting. Denies rash, difficulty breathing or any other reaction Anesthetics - Lori Type- Parabens  2018    Nausea and Vomiting ANESTHESIA. . Nausea and vomit Pt reports that he gets the side effect of nausea & vomiting after anesthesia but has never had any reaction to anesthesia other than nausea and vomiting. Denies rash, difficulty breathing or any other reaction Other Medication  2013   Side Effect Unknown (comments) ANESTHESIA. . Nausea and vomit   Pt reports that he gets the side effect of nausea & vomiting after anesthesia  but has never had any reaction to anesthesia other than nausea and vomiting. Denies rash, difficulty breathing or any other reaction Current Immunizations  Never Reviewed No immunizations on file. Not reviewed this visit You Were Diagnosed With   
  
 Codes Comments Atherosclerosis of native coronary artery of native heart without angina pectoris    -  Primary ICD-10-CM: I25.10 ICD-9-CM: 414.01 Mixed hyperlipidemia     ICD-10-CM: E78.2 ICD-9-CM: 272.2 Essential hypertension     ICD-10-CM: I10 
ICD-9-CM: 401.9 ESRD (end stage renal disease) ([facility]     ICD-10-CM: N18.6 ICD-9-CM: 335. 6 Swelling     ICD-10-CM: R60.9 ICD-9-CM: 983. 3 Vitals BP Pulse Resp Height(growth percentile) Weight(growth percentile) SpO2  
 140/70 (BP 1 Location: Right arm, BP Patient Position: Sitting) 65 18 5' 6\" (1.676 m) 156 lb (70.8 kg) 96% BMI Smoking Status 25.18 kg/m2 Former Smoker Vitals History BMI and BSA Data Body Mass Index Body Surface Area  
 25.18 kg/m 2 1.82 m 2 Preferred Pharmacy Pharmacy Name Phone [facility] [address] [place] [address] [address]  [facility] [phone] Your Updated Medication List  
  
   
This list is accurate as of 8/22/18  3:22 PM.  Always use your most recent med list.  
  
  
  
  
 allopurinol 100 mg tablet Commonly known as:  Minot Reddish Take 100 mg by mouth Three (3) times a week. Mondays Wednesday Fridays  
  
 amLODIPine 10 mg tablet Commonly known as:  Vaughan Citron Take 1 Tab by mouth daily. Indications: hypertension  
  
 atorvastatin 80 mg tablet Commonly known as:  LIPITOR Take 1 Tab by mouth nightly. COLACE PO Take  by mouth daily. ECOTRIN 325 mg tablet Generic drug:  aspirin delayed-release Take 325 mg by mouth daily. Indications: myocardial infarction prevention  
  
 lisinopril 20 mg tablet Commonly known as:  PRINIVIL, ZESTRIL  
TAKE 1 TABLET BY MOUTH EVERY DAY  
  
 metoprolol succinate 25 mg XL tablet Commonly known as:  TOPROL-XL  
TAKE 1 TABLET BY MOUTH EVERY DAY  
  
 ondansetron 4 mg disintegrating tablet Commonly known as:  ZOFRAN ODT  
 Take 1 Tab by mouth every eight (8) hours as needed for Nausea. oxyCODONE-acetaminophen 5-325 mg per tablet Commonly known as:  PERCOCET  
1 to 2 tablets every 4 hours as needed for pain RENVELA 800 mg Tab tab Generic drug:  sevelamer carbonate Take 1,600 mg by mouth three (3) times daily (with meals). tamsulosin 0.4 mg capsule Commonly known as:  FLOMAX TAKE 1 CAPSULE BY MOUTH EVERY DAY  
  
 VIRT-CAPS 1 mg capsule Generic drug:  b complex-vitamin c-folic acid TK ONE C PO  DAILY Follow-up Instructions Return in about 6 months (around 2/22/2019). Introducing [facility]! Dear Melina Angelucci: Thank you for requesting a [employer] account. Our records indicate that you already have an active [employer] account. You can access your account anytime at [email or web address] Did you know that you can access your hospital and ER discharge instructions at any time in [employer]? You can also review all of your test results from your hospital stay or ER visit. Additional Information If you have questions, please visit the Frequently Asked Questions section of the [employer] website at [email or web address] Remember, [employer] is NOT to be used for urgent needs. For medical emergencies, dial 911. Now available from your iPhone and Android! Please provide this summary of care documentation to your next provider. Your primary care clinician is listed as Hoda Brewer. If you have any questions after today's visit, please call [phone].

## 2018-08-22 NOTE — PROGRESS NOTES
HISTORY OF PRESENT ILLNESS  Kurt Schaefer is a 81 y.o. male     SUMMARY:   Problem List  Date Reviewed: 8/22/2018          Codes Class Noted    Bradycardia ICD-10-CM: R00.1  ICD-9-CM: 427.89  12/21/2015    Overview Addendum 1/7/2016 10:55 AM by Marlyn Martin MD     12/15 lopressor d/neela by Dr Breezy Serra, was taking 100 mg  will start 25 mg and watch for tamara  1/16 tolerating toprol xl 25/day             ESRD (end stage renal disease) ([facility] ICD-10-CM: N18.6  ICD-9-CM: 585.6  12/14/2015    Overview Signed 2/2/2017  8:55 AM by Marlyn Martin MD     HD since 9/16 approx             Undiagnosed cardiac murmurs ICD-10-CM: R01.1  ICD-9-CM: 785.2  5/14/2014    Overview Signed 5/14/2014  3:48 PM by Marlyn Martin MD     r/o AS             Coronary atherosclerosis of native coronary artery ICD-10-CM: I25.10  ICD-9-CM: 414.01  Unknown    Overview Addendum 5/9/2017  2:06 PM by Winston De Anda MD     Stable symptoms  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 Breezy Serra             Mitral regurgitation ICD-10-CM: I34.0  ICD-9-CM: 424.0  Unknown    Overview Addendum 5/9/2017  7:15 AM by Winston De Anda MD     2/10 trace MR;   5/14 echo normal lvef, mild MR and tr without pul htn.  Aortic root 3.8cm             BPH (benign prostatic hyperplasia) ICD-10-CM: N40.0  ICD-9-CM: 600.00  3/14/2013        Lower urinary tract symptoms (LUTS) ICD-10-CM: R39.9  ICD-9-CM: 788.99  3/14/2013        Elevated prostate specific antigen (PSA) ICD-10-CM: R97.20  ICD-9-CM: 790.93  3/14/2013        Renal insufficiency ICD-10-CM: N28.9  ICD-9-CM: 593.9  3/14/2013              Current Outpatient Prescriptions on File Prior to Visit   Medication Sig    lisinopril (PRINIVIL, ZESTRIL) 20 mg tablet TAKE 1 TABLET BY MOUTH EVERY DAY    metoprolol succinate (TOPROL-XL) 25 mg XL tablet TAKE 1 TABLET BY MOUTH EVERY DAY    atorvastatin (LIPITOR) 80 mg tablet Take 1 Tab by mouth nightly.  amLODIPine (NORVASC) 10 mg tablet Take 1 Tab by mouth daily. Indications: hypertension    ondansetron (ZOFRAN ODT) 4 mg disintegrating tablet Take 1 Tab by mouth every eight (8) hours as needed for Nausea.  VIRT-CAPS 1 mg capsule TK ONE C PO  DAILY    aspirin delayed-release (ECOTRIN) 325 mg tablet Take 325 mg by mouth daily. Indications: myocardial infarction prevention    tamsulosin (FLOMAX) 0.4 mg capsule TAKE 1 CAPSULE BY MOUTH EVERY DAY    allopurinol (ZYLOPRIM) 100 mg tablet Take 100 mg by mouth Three (3) times a week. Mondays Wednesday Fridays    oxyCODONE-acetaminophen (PERCOCET) 5-325 mg per tablet 1 to 2 tablets every 4 hours as needed for pain    sevelamer carbonate (RENVELA) 800 mg tab tab Take 1,600 mg by mouth three (3) times daily (with meals). No current facility-administered medications on file prior to visit. CARDIOLOGY STUDIES TO DATE:  5/14 echo normal lvef, mild MR and tr without pul htn. Aortic root 3.8cm  2010 normal lexiscan cardiolyte        Chief Complaint   Patient presents with    Coronary Artery Disease     HPI :  Mr. Cynthia Boswell' swelling is doing a little bit better, though it still comes and goes. He is walking some. He is elevating his legs and he is wearing support hose. According to his daughter-in-law, it appears maybe he is not doing quite as well with his sodium intake.   Dr. Leticia Persaud is following his lipids and he is having no problems with dialysis. 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 Codi High Bradycardia 12/21/2015    12/15 lopressor d/neela by Dr Hilda Carias, was taking 100 mg will start 25 mg and watch for tamara     CAD (coronary artery disease)     Chronic kidney disease     on dialysis  M-W F    Coronary atherosclerosis of native coronary artery     Stable symptoms    Essential hypertension, benign     Controlled    Hypercholesteremia     Mitral valve disorders(424.0)     Nausea & vomiting     Other and unspecified hyperlipidemia     3/12 Low density lipoproteins (LDLs) are at goal, triglycerides are at goal, high density lipoproteins (HDLs) are at goal    Postsurgical aortocoronary bypass status 1991    Prostate disease        GENERAL ROS:  A comprehensive review of systems was negative except for that written in the HPI.     Visit Vitals    /70 (BP 1 Location: Right arm, BP Patient Position: Sitting)    Pulse 65    Resp 18    Ht 5' 6\" (1.676 m)    Wt 156 lb (70.8 kg)    SpO2 96%    BMI 25.18 kg/m2       Wt Readings from Last 3 Encounters:   08/22/18 156 lb (70.8 kg)   05/09/18 161 lb (73 kg)   05/08/18 161 lb 8 oz (73.3 kg)            BP Readings from Last 3 Encounters:   08/22/18 140/70   05/09/18 150/70   05/08/18 157/54       PHYSICAL EXAM  General appearance: alert, cooperative, no distress, appears stated age  Neck: supple, symmetrical, trachea midline, no adenopathy, no carotid bruit and no JVD  Lungs: clear to auscultation bilaterally  Heart: regular rate and rhythm, S1, S2 normal, no murmur, click, rub or gallop  Extremities: edema tr    Lab Results   Component Value Date/Time    Cholesterol, total 112 08/08/2016 06:53 AM    Cholesterol, total 116 07/11/2016 07:18 AM    Cholesterol, total 116 05/31/2016 07:00 AM    Cholesterol, total 125 04/11/2016 08:02 AM    Cholesterol, total 115 03/07/2016 06:48 AM    HDL Cholesterol 62 (H) 08/08/2016 06:53 AM    HDL Cholesterol 63 (H) 07/11/2016 07:18 AM    HDL Cholesterol 69 (H) 05/31/2016 07:00 AM    HDL Cholesterol 58 04/11/2016 08:02 AM    HDL Cholesterol 57 03/07/2016 06:48 AM    LDL, calculated 35.2 08/08/2016 06:53 AM    LDL, calculated 35.4 07/11/2016 07:18 AM    LDL, calculated 32.4 05/31/2016 07:00 AM    LDL, calculated 40.2 04/11/2016 08:02 AM    LDL, calculated 39.6 03/07/2016 06:48 AM    Triglyceride 74 08/08/2016 06:53 AM    Triglyceride 88 07/11/2016 07:18 AM    Triglyceride 73 05/31/2016 07:00 AM    Triglyceride 134 04/11/2016 08:02 AM    Triglyceride 92 03/07/2016 06:48 AM    CHOL/HDL Ratio 1.8 08/08/2016 06:53 AM    CHOL/HDL Ratio 1.8 07/11/2016 07:18 AM    CHOL/HDL Ratio 1.7 05/31/2016 07:00 AM    CHOL/HDL Ratio 2.2 04/11/2016 08:02 AM    CHOL/HDL Ratio 2.0 03/07/2016 06:48 AM     ASSESSMENT  Mr. Nicholas Lugo is stable and I think asymptomatic from a cardiac standpoint, well compensated on a good medical regimen and needs no cardiac testing at this time. current treatment plan is effective, no change in therapy  lab results and schedule of future lab studies reviewed with patient  reviewed diet, exercise and weight control    Encounter Diagnoses   Name Primary?     Atherosclerosis of native coronary artery of native heart without angina pectoris Yes    Mixed hyperlipidemia     Essential hypertension     ESRD (end stage renal disease) (HCC)     Swelling      Orders Placed This Encounter    docusate sodium (COLACE PO)       Follow-up Disposition:  Return in about 6 months (around 2/22/2019).     Denise Phan MD  8/22/2018

## 2018-11-09 DIAGNOSIS — I10 ESSENTIAL HYPERTENSION: ICD-10-CM

## 2018-11-09 RX ORDER — AMLODIPINE BESYLATE 10 MG/1
TABLET ORAL
Qty: 90 TAB | Refills: 3 | Status: SHIPPED | OUTPATIENT
Start: 2018-11-09 | End: 2019-07-18 | Stop reason: SDUPTHER

## 2018-11-09 NOTE — TELEPHONE ENCOUNTER
Requested Prescriptions     Signed Prescriptions Disp Refills    amLODIPine (NORVASC) 10 mg tablet 90 Tab 3     Sig: TAKE 1 TABLET BY MOUTH DAILY FOR HIGH BLOOD PRESSURE     Authorizing Provider: Bucky Alba     Ordering User: Darryle Grill    Per Dr. Shanell Chao verbal orders

## 2019-01-20 DIAGNOSIS — E78.2 MIXED HYPERLIPIDEMIA: ICD-10-CM

## 2019-01-21 RX ORDER — METOPROLOL SUCCINATE 25 MG/1
TABLET, EXTENDED RELEASE ORAL
Qty: 90 TAB | Refills: 0 | Status: SHIPPED | OUTPATIENT
Start: 2019-01-21 | End: 2019-04-20 | Stop reason: SDUPTHER

## 2019-01-21 NOTE — TELEPHONE ENCOUNTER
Requested Prescriptions     Signed Prescriptions Disp Refills    metoprolol succinate (TOPROL-XL) 25 mg XL tablet 90 Tab 0     Sig: TAKE 1 TABLET BY MOUTH EVERY DAY     Authorizing Provider: Michelle Daly     Ordering User: Erica Sotomayor    Per Dr. Fred Krause verbal orders

## 2019-02-22 ENCOUNTER — OFFICE VISIT (OUTPATIENT)
Dept: CARDIOLOGY CLINIC | Age: 84
End: 2019-02-22

## 2019-02-22 VITALS
WEIGHT: 145.2 LBS | RESPIRATION RATE: 17 BRPM | HEIGHT: 66 IN | SYSTOLIC BLOOD PRESSURE: 148 MMHG | HEART RATE: 56 BPM | BODY MASS INDEX: 23.33 KG/M2 | OXYGEN SATURATION: 100 % | DIASTOLIC BLOOD PRESSURE: 52 MMHG

## 2019-02-22 DIAGNOSIS — N18.6 ESRD (END STAGE RENAL DISEASE) (HCC): ICD-10-CM

## 2019-02-22 DIAGNOSIS — I25.10 ATHEROSCLEROSIS OF NATIVE CORONARY ARTERY OF NATIVE HEART WITHOUT ANGINA PECTORIS: Primary | ICD-10-CM

## 2019-02-22 DIAGNOSIS — E78.2 MIXED HYPERLIPIDEMIA: ICD-10-CM

## 2019-02-22 DIAGNOSIS — I10 ESSENTIAL HYPERTENSION: ICD-10-CM

## 2019-02-22 NOTE — PROGRESS NOTES
HISTORY OF PRESENT ILLNESS Daniela Chairez is a 81 y.o. male SUMMARY:  
Problem List  Date Reviewed: 2/21/2019 Codes Class Noted Bradycardia ICD-10-CM: R00.1 ICD-9-CM: 427.89  12/21/2015 Overview Addendum 1/7/2016 10:55 AM by Dali Ramos MD  
  12/15 lopressor d/neela by Dr Leena Terrell, was taking 100 mg 
will start 25 mg and watch for tamara 1/16 tolerating toprol xl 25/day ESRD (end stage renal disease) ([facility] [address] ICD-10-CM: N18.6 ICD-9-CM: 585.6  12/14/2015 Overview Signed 2/2/2017  8:55 AM by Dali Ramos MD  
  HD since 9/16 approx Undiagnosed cardiac murmurs ICD-10-CM: R01.1 ICD-9-CM: 785.2  5/14/2014 Overview Signed 5/14/2014  3:48 PM by Dali Ramos MD  
  r/o AS Coronary atherosclerosis of native coronary artery ICD-10-CM: I25.10 ICD-9-CM: 414.01  Unknown Overview Addendum 5/9/2017  2:06 PM by Krish Nunez MD  
  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  
  7/14 LDL35; 5/16 DPD68; 12/15 LDL21; 6/15  Low density lipoproteins (LDLs) are at goal, triglycerides are at goal, high density lipoproteins (HDLs) are at goal 
  
  
   
 Postsurgical aortocoronary bypass status ICD-10-CM: Z95.1 ICD-9-CM: V45.81  Unknown Essential hypertension ICD-10-CM: I10 
ICD-9-CM: 401.9  Unknown Overview Addendum 2/2/2017  8:52 AM by Dali Ramos MD  
  2/17 incr norvasc to 10;  
F/U Dr. Leena Terrell Mitral regurgitation ICD-10-CM: I34.0 ICD-9-CM: 424.0  Unknown Overview Addendum 5/9/2017  7:15 AM by Krish Nunez MD  
  2/10 trace MR;  
5/14 echo normal lvef, mild MR and tr without pul htn. Aortic root 3.8cm BPH (benign prostatic hyperplasia) ICD-10-CM: N40.0 ICD-9-CM: 600.00  3/14/2013 Lower urinary tract symptoms (LUTS) ICD-10-CM: R39.9 ICD-9-CM: 788.99  3/14/2013 Elevated prostate specific antigen (PSA) ICD-10-CM: R97.20 ICD-9-CM: 790.93  3/14/2013 Renal insufficiency ICD-10-CM: N28.9 ICD-9-CM: 593.9  3/14/2013 Current Outpatient Medications on File Prior to Visit Medication Sig  
 metoprolol succinate (TOPROL-XL) 25 mg XL tablet TAKE 1 TABLET BY MOUTH EVERY DAY  amLODIPine (NORVASC) 10 mg tablet TAKE 1 TABLET BY MOUTH DAILY FOR HIGH BLOOD PRESSURE  
 docusate sodium (COLACE PO) Take  by mouth daily.  lisinopril (PRINIVIL, ZESTRIL) 20 mg tablet TAKE 1 TABLET BY MOUTH EVERY DAY  atorvastatin (LIPITOR) 80 mg tablet Take 1 Tab by mouth nightly.  amLODIPine (NORVASC) 10 mg tablet Take 1 Tab by mouth daily. Indications: hypertension  oxyCODONE-acetaminophen (PERCOCET) 5-325 mg per tablet 1 to 2 tablets every 4 hours as needed for pain  ondansetron (ZOFRAN ODT) 4 mg disintegrating tablet Take 1 Tab by mouth every eight (8) hours as needed for Nausea.  VIRT-CAPS 1 mg capsule TK ONE C PO  DAILY  aspirin delayed-release (ECOTRIN) 325 mg tablet Take 325 mg by mouth daily. Indications: myocardial infarction prevention  tamsulosin (FLOMAX) 0.4 mg capsule TAKE 1 CAPSULE BY MOUTH EVERY DAY  sevelamer carbonate (RENVELA) 800 mg tab tab Take 1,600 mg by mouth three (3) times daily (with meals).  allopurinol (ZYLOPRIM) 100 mg tablet Take 100 mg by mouth Three (3) times a week. Mondays Wednesday Fridays No current facility-administered medications on file prior to visit. CARDIOLOGY STUDIES TO DATE: 
5/14 echo normal lvef, mild MR and tr without pul htn. Aortic root 3.8cm 2010 normal lexiscan cardiolyte Chief Complaint Patient presents with  Coronary Artery Disease HPI : 
Mr. Barbara Linn is doing pretty well. He is still not getting any exercise.   He has cut back on his sodium, but he still has some dependent lower extremity edema and only inconsistently wears his support hose. He is not having any problems with dialysis. He brought in some blood work from earlier this month. His TSH was normal.  His A1C was 5.6. His total cholesterol is 127, triglycerides 121, HDL 73 and LDL 30.  
 
 
 
CARDIAC ROS:  
negative for chest pain, dyspnea, palpitations, syncope, orthopnea, paroxysmal nocturnal dyspnea, exertional chest pressure/discomfort, claudication Family History Problem Relation Age of Onset  Cancer Mother  Heart Disease Neg Hx   
 Heart Attack Neg Hx  Sudden Death Neg Hx  Stroke Neg Hx Past Medical History:  
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   
 F/U Dr. Nabila Dale  Bradycardia 12/21/2015  
 12/15 lopressor d/neela by Dr Nabila Dale, was taking 100 mg will start 25 mg and watch for tamara  CAD (coronary artery disease)  Chronic kidney disease   
 on dialysis  M-W F  Coronary atherosclerosis of native coronary artery Stable symptoms  Essential hypertension, benign Controlled  Hypercholesteremia  Mitral valve disorders(424.0)  Nausea & vomiting  Other and unspecified hyperlipidemia 3/12 Low density lipoproteins (LDLs) are at goal, triglycerides are at goal, high density lipoproteins (HDLs) are at goal  
 Postsurgical aortocoronary bypass status 1991  Prostate disease GENERAL ROS: 
A comprehensive review of systems was negative except for that written in the HPI. Visit Vitals /52 (BP 1 Location: Right arm, BP Patient Position: Sitting) Pulse (!) 56 Resp 17 Ht 5' 6\" (1.676 m) Wt 145 lb 3.2 oz (65.9 kg) SpO2 100% BMI 23.44 kg/m² Wt Readings from Last 3 Encounters:  
02/22/19 145 lb 3.2 oz (65.9 kg) 08/22/18 156 lb (70.8 kg) 05/09/18 161 lb (73 kg) BP Readings from Last 3 Encounters: 02/22/19 148/52  
08/22/18 140/70  
05/09/18 150/70 PHYSICAL EXAM 
General appearance: alert, cooperative, no distress, appears stated age Neurologic: Alert and oriented X 3 Neck: supple, symmetrical, trachea midline, no adenopathy, no carotid bruit and no JVD Lungs: clear to auscultation bilaterally Heart: regular rate and rhythm, S1, S2 normal, no murmur, click, rub or gallop Extremities: extremities normal, atraumatic, no cyanosis or edema Lab Results Component Value Date/Time Cholesterol, total 112 08/08/2016 06:53 AM  
 Cholesterol, total 116 07/11/2016 07:18 AM  
 Cholesterol, total 116 05/31/2016 07:00 AM  
 Cholesterol, total 125 04/11/2016 08:02 AM  
 Cholesterol, total 115 03/07/2016 06:48 AM  
 HDL Cholesterol 62 (H) 08/08/2016 06:53 AM  
 HDL Cholesterol 63 (H) 07/11/2016 07:18 AM  
 HDL Cholesterol 69 (H) 05/31/2016 07:00 AM  
 HDL Cholesterol 58 04/11/2016 08:02 AM  
 HDL Cholesterol 57 03/07/2016 06:48 AM  
 LDL, calculated 35.2 08/08/2016 06:53 AM  
 LDL, calculated 35.4 07/11/2016 07:18 AM  
 LDL, calculated 32.4 05/31/2016 07:00 AM  
 LDL, calculated 40.2 04/11/2016 08:02 AM  
 LDL, calculated 39.6 03/07/2016 06:48 AM  
 Triglyceride 74 08/08/2016 06:53 AM  
 Triglyceride 88 07/11/2016 07:18 AM  
 Triglyceride 73 05/31/2016 07:00 AM  
 Triglyceride 134 04/11/2016 08:02 AM  
 Triglyceride 92 03/07/2016 06:48 AM  
 CHOL/HDL Ratio 1.8 08/08/2016 06:53 AM  
 CHOL/HDL Ratio 1.8 07/11/2016 07:18 AM  
 CHOL/HDL Ratio 1.7 05/31/2016 07:00 AM  
 CHOL/HDL Ratio 2.2 04/11/2016 08:02 AM  
 CHOL/HDL Ratio 2.0 03/07/2016 06:48 AM  
 
ASSESSMENT Mr. Yuri Etienne is stable, asymptomatic and well compensated on a good medical regimen and needs no cardiac testing at this time. current treatment plan is effective, no change in therapy 
lab results and schedule of future lab studies reviewed with patient 
reviewed diet, exercise and weight control Encounter Diagnoses Name Primary?  Atherosclerosis of native coronary artery of native heart without angina pectoris Yes  ESRD (end stage renal disease) ([facility] [address]  Mixed hyperlipidemia  Essential hypertension No orders of the defined types were placed in this encounter. Follow-up Disposition: 
Return in about 6 months (around 8/22/2019). Demetrius Holder MD2/22/2019

## 2019-02-22 NOTE — PROGRESS NOTES
Chief Complaint Patient presents with  Coronary Artery Disease 1. Have you been to the ER, urgent care clinic since your last visit? Hospitalized since your last visit? No 
 
2. Have you seen or consulted any other health care providers outside of the [address] [place] [address] since your last visit? Include any pap smears or colon screening. No 
 
3) Do you have an Advance Directive on file? yes Patient is accompanied by self I have received verbal consent from Chanell Maloney to discuss any/all medical information while they are present in the room.

## 2019-02-25 DIAGNOSIS — E78.2 MIXED HYPERLIPIDEMIA: ICD-10-CM

## 2019-02-25 RX ORDER — LISINOPRIL 20 MG/1
TABLET ORAL
Qty: 90 TAB | Refills: 3 | Status: SHIPPED | OUTPATIENT
Start: 2019-02-25 | End: 2020-03-25

## 2019-02-25 NOTE — TELEPHONE ENCOUNTER
Requested Prescriptions     Signed Prescriptions Disp Refills    lisinopril (PRINIVIL, ZESTRIL) 20 mg tablet 90 Tab 3     Sig: TAKE 1 TABLET BY MOUTH EVERY DAY     Authorizing Provider: Fiorella Fung     Ordering User: Meghana Bradley verbal orders

## 2019-04-20 DIAGNOSIS — E78.2 MIXED HYPERLIPIDEMIA: ICD-10-CM

## 2019-04-22 RX ORDER — METOPROLOL SUCCINATE 25 MG/1
TABLET, EXTENDED RELEASE ORAL
Qty: 90 TAB | Refills: 3 | Status: SHIPPED | OUTPATIENT
Start: 2019-04-22 | End: 2020-06-09

## 2019-04-22 NOTE — TELEPHONE ENCOUNTER
Requested Prescriptions     Signed Prescriptions Disp Refills    metoprolol succinate (TOPROL-XL) 25 mg XL tablet 90 Tab 3     Sig: TAKE 1 TABLET BY MOUTH EVERY DAY     Authorizing Provider: Emma Velásquez     Ordering User: Stephane Barr    Per Dr. Dale Wilson verbal orders

## 2019-06-28 ENCOUNTER — TELEPHONE (OUTPATIENT)
Dept: CARDIOLOGY CLINIC | Age: 84
End: 2019-06-28

## 2019-06-28 NOTE — TELEPHONE ENCOUNTER
Patients daughter in law is calling to obtain a cardiac clearance for dyalisis 7/22/19 with dr Pilar Rosenbaum    (P: [phone] f: [phone])   Phone: [phone]  Please call when this has been done

## 2019-07-01 NOTE — TELEPHONE ENCOUNTER
Called Preston Ballesteros. She said patient needs cardiac clearance for new dialysis port placement under anesthesia. She said patient does not need to hold aspirin. I told her I would ask Dr. Rj Linton to advise if okay for procedure. I will fax clearance and call her back.

## 2019-07-01 NOTE — TELEPHONE ENCOUNTER
Called Mansi Howard. Notified her Dr. Britany Workman cleared patient for procedure and faxed clearance. She denied further questions or concerns.

## 2019-07-18 ENCOUNTER — TELEPHONE (OUTPATIENT)
Dept: CARDIOLOGY CLINIC | Age: 84
End: 2019-07-18

## 2019-07-18 DIAGNOSIS — I10 ESSENTIAL HYPERTENSION: ICD-10-CM

## 2019-07-18 RX ORDER — AMLODIPINE BESYLATE 10 MG/1
TABLET ORAL
Qty: 90 TAB | Refills: 0 | Status: SHIPPED | OUTPATIENT
Start: 2019-07-18 | End: 2020-03-25

## 2019-07-18 NOTE — LETTER
7/18/2019 10:16 AM 
 
Bia with [facility] Phone #: [phone] Fax #: [phone] Madhu Jorge   
(P: [phone] f: [phone]) Phone: [phone] 
[address] 
[place] [id number] [address] [facility] [id number] Concerning: cardiac clearance for new dialysis port placement Okay for procedure without special precautions Sincerely, Oscar Panda MD

## 2019-07-18 NOTE — TELEPHONE ENCOUNTER
Bia with [address] is calling requesting that the clearance that was faxed previously be put on a letter head and be re-faxed. She stated that it was an encounter between the doctor and nurse and it was difficult to read. She prefers it be a letter. Please advise.     Phone #: [phone]  Fax #: [phone]  Thanks

## 2019-09-13 ENCOUNTER — OFFICE VISIT (OUTPATIENT)
Dept: CARDIOLOGY CLINIC | Age: 84
End: 2019-09-13

## 2019-09-13 VITALS
BODY MASS INDEX: 22.34 KG/M2 | WEIGHT: 139 LBS | OXYGEN SATURATION: 100 % | HEIGHT: 66 IN | HEART RATE: 71 BPM | SYSTOLIC BLOOD PRESSURE: 110 MMHG | RESPIRATION RATE: 16 BRPM | DIASTOLIC BLOOD PRESSURE: 75 MMHG

## 2019-09-13 DIAGNOSIS — N18.6 ESRD (END STAGE RENAL DISEASE) (HCC): ICD-10-CM

## 2019-09-13 DIAGNOSIS — I10 ESSENTIAL HYPERTENSION: ICD-10-CM

## 2019-09-13 DIAGNOSIS — R00.1 BRADYCARDIA: ICD-10-CM

## 2019-09-13 DIAGNOSIS — I25.10 ATHEROSCLEROSIS OF NATIVE CORONARY ARTERY OF NATIVE HEART WITHOUT ANGINA PECTORIS: Primary | ICD-10-CM

## 2019-09-13 DIAGNOSIS — E78.2 MIXED HYPERLIPIDEMIA: ICD-10-CM

## 2020-03-24 DIAGNOSIS — E78.2 MIXED HYPERLIPIDEMIA: ICD-10-CM

## 2020-03-24 DIAGNOSIS — I10 ESSENTIAL HYPERTENSION: ICD-10-CM

## 2020-03-25 RX ORDER — AMLODIPINE BESYLATE 10 MG/1
TABLET ORAL
Qty: 90 TAB | Refills: 1 | Status: SHIPPED | OUTPATIENT
Start: 2020-03-25 | End: 2020-10-19

## 2020-03-25 RX ORDER — LISINOPRIL 20 MG/1
TABLET ORAL
Qty: 90 TAB | Refills: 1 | Status: SHIPPED | OUTPATIENT
Start: 2020-03-25 | End: 2020-09-28

## 2020-03-25 NOTE — TELEPHONE ENCOUNTER
Requested Prescriptions     Signed Prescriptions Disp Refills    lisinopriL (PRINIVIL, ZESTRIL) 20 mg tablet 90 Tab 1     Sig: TAKE 1 TABLET BY MOUTH EVERY DAY     Authorizing Provider: Alex Jung     Ordering User: Letha Perez    amLODIPine (NORVASC) 10 mg tablet 90 Tab 1     Sig: TAKE 1 TABLET BY MOUTH DAILY FOR HIGH BLOOD PRESSURE     Authorizing Provider: Alex Jung     Ordering User: Letha Perez    Per Dr. Kristy Gilmore verbal orders

## 2020-04-17 ENCOUNTER — TELEPHONE (OUTPATIENT)
Dept: CARDIOLOGY CLINIC | Age: 85
End: 2020-04-17

## 2020-04-17 NOTE — TELEPHONE ENCOUNTER
LM to rs 5/5/20 appt with Dr. Isael Batres, pt needs virtual visit scheduled, please call Antoinettebaldemar  to schedule

## 2020-05-08 ENCOUNTER — VIRTUAL VISIT (OUTPATIENT)
Dept: CARDIOLOGY CLINIC | Age: 85
End: 2020-05-08

## 2020-05-08 VITALS — HEIGHT: 66 IN | BODY MASS INDEX: 22.44 KG/M2

## 2020-05-08 DIAGNOSIS — N18.6 ESRD (END STAGE RENAL DISEASE) (HCC): ICD-10-CM

## 2020-05-08 DIAGNOSIS — I10 ESSENTIAL HYPERTENSION: Primary | ICD-10-CM

## 2020-05-08 DIAGNOSIS — I25.10 ATHEROSCLEROSIS OF NATIVE CORONARY ARTERY OF NATIVE HEART WITHOUT ANGINA PECTORIS: ICD-10-CM

## 2020-05-08 NOTE — PROGRESS NOTES
Mariusz Rojo is a 81 y.o. male evaluated via telephone on 5/8/2020. Consent:  He and/or health care decision maker is aware that that he may receive a bill for this telephone service, depending on his insurance coverage, and has provided verbal consent to proceed: Yes    5/14 echo normal lvef, mild MR and tr without pul htn. Aortic root 3.8cm  2010 normal lexiscan cardiolyte     Documentation:  Pt was contacted regarding appointment rescheduling and they requested a telephone appointment. Details of this discussion including any medical advice provided:   Mr. Stone Skinner is doing quite well. He is not having any problems with dialysis and he is getting out in the yard weather permitting and walking a bit. His leg edema is under great control and he needs no medications. His blood pressure today at the end of dialysis was 105/56 with a pulse of 70 and a weight of 61.2 kg. Mr. Stone Skinner appears to be stable and well compensated on a good medical regimen, and he needs no cardiac testing at this time. current treatment plan is effective, no change in therapy  lab results and schedule of future lab studies reviewed with patient  reviewed diet, exercise and weight control    I affirm this is a Patient Initiated Episode with an Established Patient who has not had a related appointment within my department in the past 7 days or scheduled within the next 24 hours.     Total Time: minutes: 11-20 minutes    Note: not billable if this call serves to triage the patient into an appointment for the relevant concern      Claude Mcgrath MD

## 2020-06-09 DIAGNOSIS — E78.2 MIXED HYPERLIPIDEMIA: ICD-10-CM

## 2020-06-09 RX ORDER — METOPROLOL SUCCINATE 25 MG/1
TABLET, EXTENDED RELEASE ORAL
Qty: 90 TAB | Refills: 3 | Status: SHIPPED | OUTPATIENT
Start: 2020-06-09 | End: 2021-07-22

## 2020-06-09 NOTE — TELEPHONE ENCOUNTER
Requested Prescriptions     Signed Prescriptions Disp Refills    metoprolol succinate (TOPROL-XL) 25 mg XL tablet 90 Tab 3     Sig: TAKE 1 TABLET BY MOUTH EVERY DAY     Authorizing Provider: Nadira Ventura     Ordering User: Amish Werner    Per Dr. Milena Nair verbal orders

## 2020-09-28 DIAGNOSIS — E78.2 MIXED HYPERLIPIDEMIA: ICD-10-CM

## 2020-09-28 RX ORDER — LISINOPRIL 20 MG/1
TABLET ORAL
Qty: 90 TAB | Refills: 1 | Status: SHIPPED | OUTPATIENT
Start: 2020-09-28 | End: 2021-03-24

## 2020-09-28 NOTE — TELEPHONE ENCOUNTER
Requested Prescriptions     Signed Prescriptions Disp Refills    lisinopriL (PRINIVIL, ZESTRIL) 20 mg tablet 90 Tab 1     Sig: TAKE 1 TABLET BY MOUTH EVERY DAY     Authorizing Provider: Emilee Osgood     Ordering User: [place] Lat verbal orders   Future Appointments   Date Time Provider Evette Turner   11/12/2020  1:40 PM MD ARI Allen AMB

## 2020-10-17 DIAGNOSIS — I10 ESSENTIAL HYPERTENSION: ICD-10-CM

## 2020-10-19 RX ORDER — AMLODIPINE BESYLATE 10 MG/1
TABLET ORAL
Qty: 90 TAB | Refills: 1 | Status: SHIPPED | OUTPATIENT
Start: 2020-10-19 | End: 2021-03-24

## 2020-10-19 NOTE — TELEPHONE ENCOUNTER
Requested Prescriptions     Signed Prescriptions Disp Refills    amLODIPine (NORVASC) 10 mg tablet 90 Tab 1     Sig: TAKE 1 TABLET BY MOUTH DAILY FOR HIGH BLOOD PRESSURE     Authorizing Provider: Aneudy Verdugo     Ordering User: Porter Muller Points verbal orders     Future Appointments   Date Time Provider Evette Turner   11/12/2020  1:40 PM MD ARI Weber AMB

## 2020-11-11 ENCOUNTER — TELEPHONE (OUTPATIENT)
Dept: CARDIOLOGY CLINIC | Age: 85
End: 2020-11-11

## 2020-11-11 ENCOUNTER — VIRTUAL VISIT (OUTPATIENT)
Dept: CARDIOLOGY CLINIC | Age: 85
End: 2020-11-11
Payer: MEDICARE

## 2020-11-11 DIAGNOSIS — I25.10 ATHEROSCLEROSIS OF NATIVE CORONARY ARTERY OF NATIVE HEART WITHOUT ANGINA PECTORIS: Primary | ICD-10-CM

## 2020-11-11 DIAGNOSIS — N18.6 ESRD (END STAGE RENAL DISEASE) (HCC): ICD-10-CM

## 2020-11-11 DIAGNOSIS — I10 ESSENTIAL HYPERTENSION: ICD-10-CM

## 2020-11-11 DIAGNOSIS — I34.0 NONRHEUMATIC MITRAL VALVE REGURGITATION: ICD-10-CM

## 2020-11-11 DIAGNOSIS — E78.2 MIXED HYPERLIPIDEMIA: ICD-10-CM

## 2020-11-11 PROCEDURE — 99442 PR PHYS/QHP TELEPHONE EVALUATION 11-20 MIN: CPT | Performed by: SPECIALIST

## 2020-11-11 NOTE — PROGRESS NOTES
Jim David is a 81 y.o. male evaluated via telephone on 11/11/2020. Consent:  He and/or health care decision maker is aware that that he may receive a bill for this telephone service, depending on his insurance coverage, and has provided verbal consent to proceed: Yes    5/14 echo normal lvef, mild MR and tr without pul htn. Aortic root 3.8cm  2010 normal lexiscan cardiolyte     Current Outpatient Medications on File Prior to Visit   Medication Sig    amLODIPine (NORVASC) 10 mg tablet TAKE 1 TABLET BY MOUTH DAILY FOR HIGH BLOOD PRESSURE    lisinopriL (PRINIVIL, ZESTRIL) 20 mg tablet TAKE 1 TABLET BY MOUTH EVERY DAY    metoprolol succinate (TOPROL-XL) 25 mg XL tablet TAKE 1 TABLET BY MOUTH EVERY DAY    docusate sodium (COLACE PO) Take  by mouth daily.  atorvastatin (LIPITOR) 80 mg tablet Take 1 Tab by mouth nightly.  VIRT-CAPS 1 mg capsule TK ONE C PO  DAILY    aspirin delayed-release (ECOTRIN) 325 mg tablet Take 325 mg by mouth daily. Indications: myocardial infarction prevention    tamsulosin (FLOMAX) 0.4 mg capsule TAKE 1 CAPSULE BY MOUTH EVERY DAY    sevelamer carbonate (RENVELA) 800 mg tab tab Take 1,600 mg by mouth three (3) times daily (with meals).  allopurinol (ZYLOPRIM) 100 mg tablet Take 100 mg by mouth Three (3) times a week. Mondays Wednesday Fridays     No current facility-administered medications on file prior to visit. Documentation:  Pt was contacted regarding appointment rescheduling and they requested a telephone appointment. Details of this discussion including any medical advice provided:   He is doing very well. His leg edema is under good control and he is having no problems with dialysis. He is not getting much in the way of regular exercise. No cardiac complaints. He is uncertain about his weight and blood pressure but he says that at dialysis they do not fuss at him about any irregularities or abnormalities.   His primary care is following his lipids. He sounds to be stable and asymptomatic, well compensated on a good medical regimen and needs no cardiac testing at this time. We will get a copy of his recent labs and office visit from his primary care        current treatment plan is effective, no change in therapy  lab results and schedule of future lab studies reviewed with patient  reviewed diet, exercise and weight control    I affirm this is a Patient Initiated Episode with an Established Patient who has not had a related appointment within my department in the past 7 days or scheduled within the next 24 hours.     Total Time: minutes: 11-20 minutes    Note: not billable if this call serves to triage the patient into an appointment for the relevant concern      Janae Fischer MD

## 2020-11-11 NOTE — TELEPHONE ENCOUNTER
Faxed records request to patient's PCP for labs and office note. 6 mo f/u scheduled with patient's daughter in law. Dr. Lillie Agee-  Records are on your desk for review.

## 2021-03-24 DIAGNOSIS — E78.2 MIXED HYPERLIPIDEMIA: ICD-10-CM

## 2021-03-24 DIAGNOSIS — I10 ESSENTIAL HYPERTENSION: ICD-10-CM

## 2021-03-24 RX ORDER — AMLODIPINE BESYLATE 10 MG/1
TABLET ORAL
Qty: 90 TAB | Refills: 1 | Status: SHIPPED | OUTPATIENT
Start: 2021-03-24 | End: 2021-09-24

## 2021-03-24 RX ORDER — LISINOPRIL 20 MG/1
TABLET ORAL
Qty: 90 TAB | Refills: 1 | Status: SHIPPED | OUTPATIENT
Start: 2021-03-24 | End: 2021-09-24

## 2021-03-24 NOTE — TELEPHONE ENCOUNTER
Requested Prescriptions     Signed Prescriptions Disp Refills    lisinopriL (PRINIVIL, ZESTRIL) 20 mg tablet 90 Tab 1     Sig: TAKE 1 TABLET BY MOUTH EVERY DAY     Authorizing Provider: Jake Osorio     Ordering User: Smita Garcia    amLODIPine (NORVASC) 10 mg tablet 90 Tab 1     Sig: TAKE 1 TABLET BY MOUTH DAILY FOR HIGH BLOOD PRESSURE     Authorizing Provider: Jake Osorio     Ordering User: Smita Garcia    Per Dr. Davis Garcia verbal orders

## 2021-05-14 ENCOUNTER — OFFICE VISIT (OUTPATIENT)
Dept: CARDIOLOGY CLINIC | Age: 86
End: 2021-05-14
Payer: MEDICARE

## 2021-05-14 VITALS
SYSTOLIC BLOOD PRESSURE: 130 MMHG | HEIGHT: 66 IN | OXYGEN SATURATION: 100 % | HEART RATE: 62 BPM | WEIGHT: 135.6 LBS | DIASTOLIC BLOOD PRESSURE: 80 MMHG | RESPIRATION RATE: 12 BRPM | BODY MASS INDEX: 21.79 KG/M2

## 2021-05-14 DIAGNOSIS — I25.10 ATHEROSCLEROSIS OF NATIVE CORONARY ARTERY OF NATIVE HEART WITHOUT ANGINA PECTORIS: Primary | ICD-10-CM

## 2021-05-14 DIAGNOSIS — E78.2 MIXED HYPERLIPIDEMIA: ICD-10-CM

## 2021-05-14 DIAGNOSIS — N18.6 ESRD (END STAGE RENAL DISEASE) (HCC): ICD-10-CM

## 2021-05-14 DIAGNOSIS — I10 ESSENTIAL HYPERTENSION: ICD-10-CM

## 2021-05-14 PROCEDURE — G9231 DOC ESRD DIA TRANS PREG: HCPCS | Performed by: SPECIALIST

## 2021-05-14 PROCEDURE — 99213 OFFICE O/P EST LOW 20 MIN: CPT | Performed by: SPECIALIST

## 2021-05-14 PROCEDURE — G8510 SCR DEP NEG, NO PLAN REQD: HCPCS | Performed by: SPECIALIST

## 2021-05-14 PROCEDURE — G8427 DOCREV CUR MEDS BY ELIG CLIN: HCPCS | Performed by: SPECIALIST

## 2021-05-14 PROCEDURE — G0463 HOSPITAL OUTPT CLINIC VISIT: HCPCS | Performed by: SPECIALIST

## 2021-05-14 PROCEDURE — G8420 CALC BMI NORM PARAMETERS: HCPCS | Performed by: SPECIALIST

## 2021-05-14 PROCEDURE — G8536 NO DOC ELDER MAL SCRN: HCPCS | Performed by: SPECIALIST

## 2021-05-14 PROCEDURE — 1101F PT FALLS ASSESS-DOCD LE1/YR: CPT | Performed by: SPECIALIST

## 2021-05-14 NOTE — PROGRESS NOTES
HISTORY OF PRESENT ILLNESS  Shital Jauregui is a 81 y.o. male     SUMMARY:   Problem List  Date Reviewed: 5/14/2021          Codes Class Noted    Bradycardia ICD-10-CM: R00.1  ICD-9-CM: 427.89  12/21/2015    Overview Addendum 1/7/2016 10:55 AM by Mauricio Etienne MD     12/15 lopressor d/neela by Dr Mary Carmen Grewal, was taking 100 mg  will start 25 mg and watch for tamara  1/16 tolerating toprol xl 25/day             ESRD (end stage renal disease) ([facility] [address] ICD-10-CM: N18.6  ICD-9-CM: 585.6  12/14/2015    Overview Signed 2/2/2017  8:55 AM by Mauricio Etienne MD     HD since 9/16 approx             Undiagnosed cardiac murmurs ICD-10-CM: R01.1  ICD-9-CM: 785.2  5/14/2014    Overview Signed 5/14/2014  3:48 PM by Mauricio Etienne MD     r/o AS             Coronary atherosclerosis of native coronary artery ICD-10-CM: I25.10  ICD-9-CM: 414.01  Unknown    Overview Addendum 5/9/2017  2:06 PM by Ashu Alfonso MD     Stable symptoms  1991 single vessel cabg after failed pci, details unknown             HLD (hyperlipidemia) ICD-10-CM: E78.5  ICD-9-CM: 272.4  Unknown    Overview Addendum 2/2/2017  8:46 AM by Mauricio Etienne MD     8/16 Jose Alejandro Kartik; 5/16 LDL32; 12/15 LDL21; 6/15  Low density lipoproteins (LDLs) are at goal, triglycerides are at goal, high density lipoproteins (HDLs) are at goal             Postsurgical aortocoronary bypass status ICD-10-CM: Z95.1  ICD-9-CM: V45.81  Unknown        Essential hypertension ICD-10-CM: I10  ICD-9-CM: 401.9  Unknown    Overview Addendum 2/2/2017  8:52 AM by Mauricio Etienne MD     2/17 incr norvasc to 10;   F/U Dr. Mary Carmen Grewal             Mitral regurgitation ICD-10-CM: I34.0  ICD-9-CM: 424.0  Unknown    Overview Addendum 5/9/2017  7:15 AM by Ashu Alfonso MD     2/10 trace MR;   5/14 echo normal lvef, mild MR and tr without pul htn.  Aortic root 3.8cm             BPH (benign prostatic hyperplasia) ICD-10-CM: N40.0  ICD-9-CM: 600.00  3/14/2013        Lower urinary tract symptoms (LUTS) ICD-10-CM: R39.9  ICD-9-CM: 788.99  3/14/2013        Elevated prostate specific antigen (PSA) ICD-10-CM: R97.20  ICD-9-CM: 790.93  3/14/2013        Renal insufficiency ICD-10-CM: N28.9  ICD-9-CM: 593.9  3/14/2013              Current Outpatient Medications on File Prior to Visit   Medication Sig    lisinopriL (PRINIVIL, ZESTRIL) 20 mg tablet TAKE 1 TABLET BY MOUTH EVERY DAY    amLODIPine (NORVASC) 10 mg tablet TAKE 1 TABLET BY MOUTH DAILY FOR HIGH BLOOD PRESSURE    metoprolol succinate (TOPROL-XL) 25 mg XL tablet TAKE 1 TABLET BY MOUTH EVERY DAY    docusate sodium (COLACE PO) Take  by mouth daily.  atorvastatin (LIPITOR) 80 mg tablet Take 1 Tab by mouth nightly.  VIRT-CAPS 1 mg capsule TK ONE C PO  DAILY    aspirin delayed-release (ECOTRIN) 325 mg tablet Take 325 mg by mouth daily. Indications: myocardial infarction prevention    tamsulosin (FLOMAX) 0.4 mg capsule TAKE 1 CAPSULE BY MOUTH EVERY DAY    sevelamer carbonate (RENVELA) 800 mg tab tab Take 1,600 mg by mouth three (3) times daily (with meals).  allopurinol (ZYLOPRIM) 100 mg tablet Take 100 mg by mouth Three (3) times a week. Mondays Wednesday Fridays     No current facility-administered medications on file prior to visit. CARDIOLOGY STUDIES TO DATE:  5/14 echo normal lvef, mild MR and tr without pul htn. Aortic root 3.8cm  2010 normal lexiscan cardiolyte    Chief Complaint   Patient presents with    Follow-up     HPI :  He is doing well. His weight is down about 4 pounds today but he had dialysis this morning. He has been tolerating that without any difficulty, neither high nor low low blood pressure. He is relatively inactive and has some dependent edema from time to time. His primary care is following his lipids.   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 Trell Wheeler Bradycardia 12/21/2015    12/15 lopressor d/neela by Dr Sandra Cordero, was taking 100 mg will start 25 mg and watch for tamara     CAD (coronary artery disease)     Chronic kidney disease     on dialysis  M-W F    Coronary atherosclerosis of native coronary artery     Stable symptoms    Essential hypertension, benign     Controlled    Hypercholesteremia     Mitral valve disorders(424.0)     Nausea & vomiting     Other and unspecified hyperlipidemia     3/12 Low density lipoproteins (LDLs) are at goal, triglycerides are at goal, high density lipoproteins (HDLs) are at goal    Postsurgical aortocoronary bypass status 1991    Prostate disease        GENERAL ROS:  A comprehensive review of systems was negative except for that written in the HPI.     Visit Vitals  /80 (BP 1 Location: Left lower arm, BP Patient Position: Sitting, BP Cuff Size: Adult)   Pulse 62   Resp 12   Ht 5' 6\" (1.676 m)   Wt 135 lb 9.6 oz (61.5 kg)   SpO2 100%   BMI 21.89 kg/m²       Wt Readings from Last 3 Encounters:   05/14/21 135 lb 9.6 oz (61.5 kg)   09/13/19 139 lb (63 kg)   02/22/19 145 lb 3.2 oz (65.9 kg)            BP Readings from Last 3 Encounters:   05/14/21 130/80   09/13/19 110/75   02/22/19 148/52       PHYSICAL EXAM  General appearance: alert, cooperative, no distress, appears stated age  Neurologic: Alert and oriented X 3  Neck: supple, symmetrical, trachea midline, no adenopathy, no carotid bruit and no JVD  Lungs: clear to auscultation bilaterally  Heart: regular rate and rhythm, S1, S2 normal, no murmur, click, rub or gallop  Extremities: edema tr    Lab Results   Component Value Date/Time    Cholesterol, total 112 08/08/2016 06:53 AM    Cholesterol, total 116 07/11/2016 07:18 AM    Cholesterol, total 116 05/31/2016 07:00 AM    Cholesterol, total 125 04/11/2016 08:02 AM    Cholesterol, total 115 03/07/2016 06:48 AM    HDL Cholesterol 62 (H) 08/08/2016 06:53 AM    HDL Cholesterol 63 (H) 07/11/2016 07:18 AM    HDL Cholesterol 69 (H) 05/31/2016 07:00 AM    HDL Cholesterol 58 04/11/2016 08:02 AM    HDL Cholesterol 57 03/07/2016 06:48 AM    LDL, calculated 35.2 08/08/2016 06:53 AM    LDL, calculated 35.4 07/11/2016 07:18 AM    LDL, calculated 32.4 05/31/2016 07:00 AM    LDL, calculated 40.2 04/11/2016 08:02 AM    LDL, calculated 39.6 03/07/2016 06:48 AM    Triglyceride 74 08/08/2016 06:53 AM    Triglyceride 88 07/11/2016 07:18 AM    Triglyceride 73 05/31/2016 07:00 AM    Triglyceride 134 04/11/2016 08:02 AM    Triglyceride 92 03/07/2016 06:48 AM    CHOL/HDL Ratio 1.8 08/08/2016 06:53 AM    CHOL/HDL Ratio 1.8 07/11/2016 07:18 AM    CHOL/HDL Ratio 1.7 05/31/2016 07:00 AM    CHOL/HDL Ratio 2.2 04/11/2016 08:02 AM    CHOL/HDL Ratio 2.0 03/07/2016 06:48 AM     ASSESSMENT :      He is stable and asymptomatic, well compensated on a good medical regimen and needs no cardiac testing at this time. current treatment plan is effective, no change in therapy  lab results and schedule of future lab studies reviewed with patient  reviewed diet, exercise and weight control    Encounter Diagnoses   Name Primary?  Atherosclerosis of native coronary artery of native heart without angina pectoris Yes    Essential hypertension     ESRD (end stage renal disease) ([facility] [address]     Mixed hyperlipidemia      No orders of the defined types were placed in this encounter. Follow-up and Dispositions    · Return in about 6 months (around 11/14/2021). Liston Babinski, MD  5/14/2021  Please note that this dictation was completed with [employer], the [employer] voice recognition software.   Quite often unanticipated grammatical, syntax, homophones, and other interpretive errors are inadvertently transcribed by the computer software. Please disregard these errors. Please excuse any errors that have escaped final proofreading. Thank you.

## 2021-07-21 DIAGNOSIS — E78.2 MIXED HYPERLIPIDEMIA: ICD-10-CM

## 2021-07-22 RX ORDER — METOPROLOL SUCCINATE 25 MG/1
TABLET, EXTENDED RELEASE ORAL
Qty: 90 TABLET | Refills: 3 | Status: SHIPPED | OUTPATIENT
Start: 2021-07-22 | End: 2022-07-21

## 2021-07-22 NOTE — TELEPHONE ENCOUNTER
Requested Prescriptions     Signed Prescriptions Disp Refills    metoprolol succinate (TOPROL-XL) 25 mg XL tablet 90 Tablet 3     Sig: TAKE 1 TABLET BY MOUTH EVERY DAY     Authorizing Provider: William Moya     Ordering User: Bakari Sherman    Per Dr. Nicol Busby verbal orders

## 2021-09-24 DIAGNOSIS — E78.2 MIXED HYPERLIPIDEMIA: ICD-10-CM

## 2021-09-24 DIAGNOSIS — I10 ESSENTIAL HYPERTENSION: ICD-10-CM

## 2021-09-24 RX ORDER — LISINOPRIL 20 MG/1
TABLET ORAL
Qty: 90 TABLET | Refills: 1 | Status: SHIPPED | OUTPATIENT
Start: 2021-09-24 | End: 2022-03-23

## 2021-09-24 RX ORDER — AMLODIPINE BESYLATE 10 MG/1
TABLET ORAL
Qty: 90 TABLET | Refills: 1 | Status: SHIPPED | OUTPATIENT
Start: 2021-09-24 | End: 2022-03-23

## 2021-09-24 NOTE — TELEPHONE ENCOUNTER
Requested Prescriptions     Signed Prescriptions Disp Refills    amLODIPine (NORVASC) 10 mg tablet 90 Tablet 1     Sig: TAKE 1 TABLET BY MOUTH DAILY FOR HIGH BLOOD PRESSURE     Authorizing Provider: Yesy Pierce     Ordering User: Sonal Gallardo    lisinopriL (PRINIVIL, ZESTRIL) 20 mg tablet 90 Tablet 1     Sig: TAKE 1 TABLET BY MOUTH EVERY DAY     Authorizing Provider: Yesy Pierce     Ordering User: Sonal Michele verbal orders

## 2021-10-13 ENCOUNTER — TRANSCRIBE ORDER (OUTPATIENT)
Dept: SCHEDULING | Age: 86
End: 2021-10-13

## 2021-10-13 DIAGNOSIS — H40.1130 PRIMARY OPEN ANGLE GLAUCOMA OF BOTH EYES: Primary | ICD-10-CM

## 2021-10-14 ENCOUNTER — TRANSCRIBE ORDER (OUTPATIENT)
Dept: SCHEDULING | Age: 86
End: 2021-10-14

## 2021-10-14 DIAGNOSIS — H40.1130 PRIMARY OPEN ANGLE GLAUCOMA OF BOTH EYES: Primary | ICD-10-CM

## 2021-10-17 ENCOUNTER — TRANSCRIBE ORDER (OUTPATIENT)
Dept: SCHEDULING | Age: 86
End: 2021-10-17

## 2021-10-17 DIAGNOSIS — H46.9 OPTIC NEURITIS: Primary | ICD-10-CM

## 2021-10-17 DIAGNOSIS — M79.89 SWELLING OF BOTH LOWER EXTREMITIES: Primary | ICD-10-CM

## 2021-10-26 ENCOUNTER — HOSPITAL ENCOUNTER (OUTPATIENT)
Dept: MRI IMAGING | Age: 86
Discharge: HOME | End: 2021-10-26
Attending: STUDENT IN AN ORGANIZED HEALTH CARE EDUCATION/TRAINING PROGRAM
Payer: MEDICARE

## 2021-10-26 VITALS — BODY MASS INDEX: 21.79 KG/M2 | WEIGHT: 135 LBS

## 2021-10-26 DIAGNOSIS — H46.9 OPTIC NEURITIS: ICD-10-CM

## 2021-10-26 PROCEDURE — 70540 MRI ORBIT/FACE/NECK W/O DYE: CPT

## 2021-10-26 RX ORDER — GADOTERATE MEGLUMINE 376.9 MG/ML
10 INJECTION INTRAVENOUS
Status: DISCONTINUED | OUTPATIENT
Start: 2021-10-26 | End: 2021-10-26

## 2021-12-01 ENCOUNTER — OFFICE VISIT (OUTPATIENT)
Dept: CARDIOLOGY CLINIC | Age: 86
End: 2021-12-01
Payer: MEDICARE

## 2021-12-01 VITALS
DIASTOLIC BLOOD PRESSURE: 60 MMHG | RESPIRATION RATE: 14 BRPM | SYSTOLIC BLOOD PRESSURE: 170 MMHG | BODY MASS INDEX: 22.24 KG/M2 | HEART RATE: 60 BPM | HEIGHT: 66 IN | OXYGEN SATURATION: 96 % | WEIGHT: 138.4 LBS

## 2021-12-01 DIAGNOSIS — I10 ESSENTIAL HYPERTENSION: ICD-10-CM

## 2021-12-01 DIAGNOSIS — I34.0 NONRHEUMATIC MITRAL VALVE REGURGITATION: ICD-10-CM

## 2021-12-01 DIAGNOSIS — I25.10 ATHEROSCLEROSIS OF NATIVE CORONARY ARTERY OF NATIVE HEART WITHOUT ANGINA PECTORIS: Primary | ICD-10-CM

## 2021-12-01 DIAGNOSIS — E78.2 MIXED HYPERLIPIDEMIA: ICD-10-CM

## 2021-12-01 PROCEDURE — 1101F PT FALLS ASSESS-DOCD LE1/YR: CPT | Performed by: SPECIALIST

## 2021-12-01 PROCEDURE — G8536 NO DOC ELDER MAL SCRN: HCPCS | Performed by: SPECIALIST

## 2021-12-01 PROCEDURE — G8427 DOCREV CUR MEDS BY ELIG CLIN: HCPCS | Performed by: SPECIALIST

## 2021-12-01 PROCEDURE — G8420 CALC BMI NORM PARAMETERS: HCPCS | Performed by: SPECIALIST

## 2021-12-01 PROCEDURE — G9231 DOC ESRD DIA TRANS PREG: HCPCS | Performed by: SPECIALIST

## 2021-12-01 PROCEDURE — G8510 SCR DEP NEG, NO PLAN REQD: HCPCS | Performed by: SPECIALIST

## 2021-12-01 PROCEDURE — 99213 OFFICE O/P EST LOW 20 MIN: CPT | Performed by: SPECIALIST

## 2021-12-01 PROCEDURE — G0463 HOSPITAL OUTPT CLINIC VISIT: HCPCS | Performed by: SPECIALIST

## 2021-12-01 NOTE — PROGRESS NOTES
HISTORY OF PRESENT ILLNESS  Vivek Acosta is a 81 y.o. male     SUMMARY:   Problem List  Date Reviewed: 12/1/2021          Codes Class Noted    Bradycardia ICD-10-CM: R00.1  ICD-9-CM: 427.89  12/21/2015    Overview Addendum 1/7/2016 10:55 AM by Chu Cee MD     12/15 lopressor d/neela by Dr Kingsley Pollock, was taking 100 mg  will start 25 mg and watch for tamara  1/16 tolerating toprol xl 25/day             ESRD (end stage renal disease) ([facility] [address] ICD-10-CM: N18.6  ICD-9-CM: 585.6  12/14/2015    Overview Signed 2/2/2017  8:55 AM by Chu Cee MD     HD since 9/16 approx             Undiagnosed cardiac murmurs ICD-10-CM: R01.1  ICD-9-CM: 785.2  5/14/2014    Overview Signed 5/14/2014  3:48 PM by Chu Cee MD     r/o AS             Coronary atherosclerosis of native coronary artery ICD-10-CM: I25.10  ICD-9-CM: 414.01  Unknown    Overview Addendum 5/9/2017  2:06 PM by Mariia Diego MD     Stable symptoms  1991 single vessel cabg after failed pci, details unknown             HLD (hyperlipidemia) ICD-10-CM: E78.5  ICD-9-CM: 272.4  Unknown    Overview Addendum 2/2/2017  8:46 AM by Chu Cee MD     8/16 Chevy Merritts; 5/16 LDL32; 12/15 LDL21; 6/15  Low density lipoproteins (LDLs) are at goal, triglycerides are at goal, high density lipoproteins (HDLs) are at goal             Postsurgical aortocoronary bypass status ICD-10-CM: Z95.1  ICD-9-CM: V45.81  Unknown        Essential hypertension ICD-10-CM: I10  ICD-9-CM: 401.9  Unknown    Overview Addendum 2/2/2017  8:52 AM by Chu Cee MD     2/17 incr norvasc to 10;   F/U Dr. Kingsley Pollock             Mitral regurgitation ICD-10-CM: I34.0  ICD-9-CM: 424.0  Unknown    Overview Addendum 5/9/2017  7:15 AM by Mariia Diego MD     2/10 trace MR;   5/14 echo normal lvef, mild MR and tr without pul htn.  Aortic root 3.8cm             BPH (benign prostatic hyperplasia) ICD-10-CM: N40.0  ICD-9-CM: 600.00  3/14/2013        Lower urinary tract symptoms (LUTS) ICD-10-CM: R39.9  ICD-9-CM: 788.99  3/14/2013        Elevated prostate specific antigen (PSA) ICD-10-CM: R97.20  ICD-9-CM: 790.93  3/14/2013        Renal insufficiency ICD-10-CM: N28.9  ICD-9-CM: 593.9  3/14/2013              Current Outpatient Medications on File Prior to Visit   Medication Sig    amLODIPine (NORVASC) 10 mg tablet TAKE 1 TABLET BY MOUTH DAILY FOR HIGH BLOOD PRESSURE    lisinopriL (PRINIVIL, ZESTRIL) 20 mg tablet TAKE 1 TABLET BY MOUTH EVERY DAY    metoprolol succinate (TOPROL-XL) 25 mg XL tablet TAKE 1 TABLET BY MOUTH EVERY DAY    docusate sodium (COLACE PO) Take  by mouth daily.  atorvastatin (LIPITOR) 80 mg tablet Take 1 Tab by mouth nightly.  VIRT-CAPS 1 mg capsule TK ONE C PO  DAILY    aspirin delayed-release (ECOTRIN) 325 mg tablet Take 325 mg by mouth daily. Indications: myocardial infarction prevention    tamsulosin (FLOMAX) 0.4 mg capsule TAKE 1 CAPSULE BY MOUTH EVERY DAY    sevelamer carbonate (RENVELA) 800 mg tab tab Take 1,600 mg by mouth three (3) times daily (with meals).  allopurinol (ZYLOPRIM) 100 mg tablet Take 100 mg by mouth Three (3) times a week. Mondays Wednesday Fridays     No current facility-administered medications on file prior to visit. CARDIOLOGY STUDIES TO DATE:  5/14 echo normal lvef, mild MR and tr without pul htn. Aortic root 3.8cm  2010 normal lexiscan cardiolyte    Chief Complaint   Patient presents with    Follow-up     HPI :  He is doing well with no specific cardiac complaints other than some mild dependent lower extremity edema which is an old problem. He is not having any problems with dialysis. His blood pressure is little elevated today but turns out he had dialysis this morning and came straight here, and on dialysis days he does not take his blood pressure medicines until he gets home. Primary care is following his lipids.   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 Radu Valenzuela Bradycardia 12/21/2015    12/15 lopressor d/neela by Dr Luis Alberto Serna, was taking 100 mg will start 25 mg and watch for tamara     CAD (coronary artery disease)     Chronic kidney disease     on dialysis  M-W F    Coronary atherosclerosis of native coronary artery     Stable symptoms    Essential hypertension, benign     Controlled    Hypercholesteremia     Mitral valve disorders(424.0)     Nausea & vomiting     Other and unspecified hyperlipidemia     3/12 Low density lipoproteins (LDLs) are at goal, triglycerides are at goal, high density lipoproteins (HDLs) are at goal    Postsurgical aortocoronary bypass status 1991    Prostate disease        GENERAL ROS:  A comprehensive review of systems was negative except for that written in the HPI.     Visit Vitals  BP (!) 170/60 (BP 1 Location: Left arm, BP Patient Position: Sitting, BP Cuff Size: Adult)   Pulse 60   Resp 14   Ht 5' 6\" (1.676 m)   Wt 138 lb 6.4 oz (62.8 kg)   SpO2 96%   BMI 22.34 kg/m²       Wt Readings from Last 3 Encounters:   12/01/21 138 lb 6.4 oz (62.8 kg)   10/26/21 135 lb (61.2 kg)   05/14/21 135 lb 9.6 oz (61.5 kg)            BP Readings from Last 3 Encounters:   12/01/21 (!) 170/60   05/14/21 130/80   09/13/19 110/75       PHYSICAL EXAM  General appearance: alert, cooperative, no distress, appears stated age  Neurologic: Alert and oriented X 3  Neck: supple, symmetrical, trachea midline, no adenopathy, no carotid bruit and no JVD  Lungs: clear to auscultation bilaterally  Heart: regular rate and rhythm, S1, S2 normal, no S3 or S4, systolic murmur: early systolic 1/6, crescendo at 2nd right intercostal space  Extremities: edema tr    Lab Results   Component Value Date/Time    Cholesterol, total 112 08/08/2016 06:53 AM    Cholesterol, total 116 07/11/2016 07:18 AM    Cholesterol, total 116 05/31/2016 07:00 AM    Cholesterol, total 125 04/11/2016 08:02 AM    Cholesterol, total 115 03/07/2016 06:48 AM    HDL Cholesterol 62 (H) 08/08/2016 06:53 AM    HDL Cholesterol 63 (H) 07/11/2016 07:18 AM    HDL Cholesterol 69 (H) 05/31/2016 07:00 AM    HDL Cholesterol 58 04/11/2016 08:02 AM    HDL Cholesterol 57 03/07/2016 06:48 AM    LDL, calculated 35.2 08/08/2016 06:53 AM    LDL, calculated 35.4 07/11/2016 07:18 AM    LDL, calculated 32.4 05/31/2016 07:00 AM    LDL, calculated 40.2 04/11/2016 08:02 AM    LDL, calculated 39.6 03/07/2016 06:48 AM    Triglyceride 74 08/08/2016 06:53 AM    Triglyceride 88 07/11/2016 07:18 AM    Triglyceride 73 05/31/2016 07:00 AM    Triglyceride 134 04/11/2016 08:02 AM    Triglyceride 92 03/07/2016 06:48 AM    CHOL/HDL Ratio 1.8 08/08/2016 06:53 AM    CHOL/HDL Ratio 1.8 07/11/2016 07:18 AM    CHOL/HDL Ratio 1.7 05/31/2016 07:00 AM    CHOL/HDL Ratio 2.2 04/11/2016 08:02 AM    CHOL/HDL Ratio 2.0 03/07/2016 06:48 AM     ASSESSMENT :      He is stable and asymptomatic, well compensated on a good medical regimen and needs no cardiac testing at this time. current treatment plan is effective, no change in therapy  lab results and schedule of future lab studies reviewed with patient  reviewed diet, exercise and weight control    Encounter Diagnoses   Name Primary?  Atherosclerosis of native coronary artery of native heart without angina pectoris Yes    Essential hypertension     Nonrheumatic mitral valve regurgitation     Mixed hyperlipidemia      No orders of the defined types were placed in this encounter. Follow-up and Dispositions    · Return in about 6 months (around 6/1/2022).          Charo Silva III, MD  12/1/2021  Please note that this dictation was completed with [employer], the computer voice recognition software. Quite often unanticipated grammatical, syntax, homophones, and other interpretive errors are inadvertently transcribed by the computer software. Please disregard these errors. Please excuse any errors that have escaped final proofreading. Thank you.

## 2022-03-23 DIAGNOSIS — I10 ESSENTIAL HYPERTENSION: ICD-10-CM

## 2022-03-23 DIAGNOSIS — E78.2 MIXED HYPERLIPIDEMIA: ICD-10-CM

## 2022-03-23 RX ORDER — AMLODIPINE BESYLATE 10 MG/1
TABLET ORAL
Qty: 90 TABLET | Refills: 1 | Status: SHIPPED | OUTPATIENT
Start: 2022-03-23 | End: 2022-09-19

## 2022-03-23 RX ORDER — LISINOPRIL 20 MG/1
TABLET ORAL
Qty: 90 TABLET | Refills: 1 | Status: SHIPPED | OUTPATIENT
Start: 2022-03-23 | End: 2022-09-19

## 2022-03-23 NOTE — TELEPHONE ENCOUNTER
Requested Prescriptions     Signed Prescriptions Disp Refills    lisinopriL (PRINIVIL, ZESTRIL) 20 mg tablet 90 Tablet 1     Sig: TAKE 1 TABLET BY MOUTH EVERY DAY     Authorizing Provider: Joselin Drake     Ordering User: Wyatt Hernandez    amLODIPine (NORVASC) 10 mg tablet 90 Tablet 1     Sig: TAKE 1 TABLET BY MOUTH DAILY FOR HIGH BLOOD PRESSURE     Authorizing Provider: Joselin Drake     Ordering User: Wyatt Hernandez    Per Dr. Kate Luna verbal orders

## 2022-06-15 ENCOUNTER — OFFICE VISIT (OUTPATIENT)
Dept: CARDIOLOGY CLINIC | Age: 87
End: 2022-06-15
Payer: COMMERCIAL

## 2022-06-15 VITALS
RESPIRATION RATE: 18 BRPM | SYSTOLIC BLOOD PRESSURE: 138 MMHG | HEART RATE: 58 BPM | HEIGHT: 66 IN | BODY MASS INDEX: 20.57 KG/M2 | DIASTOLIC BLOOD PRESSURE: 70 MMHG | WEIGHT: 128 LBS

## 2022-06-15 DIAGNOSIS — N18.6 ESRD (END STAGE RENAL DISEASE) (HCC): ICD-10-CM

## 2022-06-15 DIAGNOSIS — I10 ESSENTIAL HYPERTENSION: ICD-10-CM

## 2022-06-15 DIAGNOSIS — I25.10 ATHEROSCLEROSIS OF NATIVE CORONARY ARTERY OF NATIVE HEART WITHOUT ANGINA PECTORIS: Primary | ICD-10-CM

## 2022-06-15 DIAGNOSIS — E78.2 MIXED HYPERLIPIDEMIA: ICD-10-CM

## 2022-06-15 PROCEDURE — 99213 OFFICE O/P EST LOW 20 MIN: CPT | Performed by: SPECIALIST

## 2022-06-15 PROCEDURE — 1123F ACP DISCUSS/DSCN MKR DOCD: CPT | Performed by: SPECIALIST

## 2022-06-15 RX ORDER — BRIMONIDINE TARTRATE 2 MG/ML
SOLUTION/ DROPS OPHTHALMIC
COMMUNITY
Start: 2022-04-28

## 2022-06-15 NOTE — PROGRESS NOTES
HISTORY OF PRESENT ILLNESS  Morgan Corona is a 81 y.o. male     SUMMARY:   Problem List  Date Reviewed: 6/15/2022          Codes Class Noted    Bradycardia ICD-10-CM: R00.1  ICD-9-CM: 427.89  12/21/2015    Overview Addendum 1/7/2016 10:55 AM by Evelin Lehman MD     12/15 lopressor d/neela by Dr Mariajose Payne, was taking 100 mg  will start 25 mg and watch for tamara  1/16 tolerating toprol xl 25/day             ESRD (end stage renal disease) ([facility] [address] ICD-10-CM: N18.6  ICD-9-CM: 585.6  12/14/2015    Overview Signed 2/2/2017  8:55 AM by Evelin Lehman MD     HD since 9/16 approx             Undiagnosed cardiac murmurs ICD-10-CM: R01.1  ICD-9-CM: 785.2  5/14/2014    Overview Signed 5/14/2014  3:48 PM by Evelin Lehman MD     r/o AS             Coronary atherosclerosis of native coronary artery ICD-10-CM: I25.10  ICD-9-CM: 414.01  Unknown    Overview Addendum 5/9/2017  2:06 PM by Sumeet Martins MD     Stable symptoms  1991 single vessel cabg after failed pci, details unknown             HLD (hyperlipidemia) ICD-10-CM: E78.5  ICD-9-CM: 272.4  Unknown    Overview Addendum 2/2/2017  8:46 AM by Evelin Lehman MD     8/16 Ezio Meuse; 5/16 LDL32; 12/15 LDL21; 6/15  Low density lipoproteins (LDLs) are at goal, triglycerides are at goal, high density lipoproteins (HDLs) are at goal             Postsurgical aortocoronary bypass status ICD-10-CM: Z95.1  ICD-9-CM: V45.81  Unknown        Essential hypertension ICD-10-CM: I10  ICD-9-CM: 401.9  Unknown    Overview Addendum 2/2/2017  8:52 AM by Evelin Lehman MD     2/17 incr norvasc to 10;   F/U Dr. Mariajose Payne             Mitral regurgitation ICD-10-CM: I34.0  ICD-9-CM: 424.0  Unknown    Overview Addendum 5/9/2017  7:15 AM by Sumeet Martins MD     2/10 trace MR;   5/14 echo normal lvef, mild MR and tr without pul htn.  Aortic root 3.8cm             BPH (benign prostatic hyperplasia) ICD-10-CM: N40.0  ICD-9-CM: 600.00  3/14/2013        Lower urinary tract symptoms (LUTS) ICD-10-CM: R39.9  ICD-9-CM: 788.99  3/14/2013        Elevated prostate specific antigen (PSA) ICD-10-CM: R97.20  ICD-9-CM: 790.93  3/14/2013        Renal insufficiency ICD-10-CM: N28.9  ICD-9-CM: 593.9  3/14/2013              Current Outpatient Medications on File Prior to Visit   Medication Sig    brimonidine (ALPHAGAN) 0.2 % ophthalmic solution INSTILL 1 DROP IN LEFT EYE TWICE DAILY    lisinopriL (PRINIVIL, ZESTRIL) 20 mg tablet TAKE 1 TABLET BY MOUTH EVERY DAY    amLODIPine (NORVASC) 10 mg tablet TAKE 1 TABLET BY MOUTH DAILY FOR HIGH BLOOD PRESSURE    metoprolol succinate (TOPROL-XL) 25 mg XL tablet TAKE 1 TABLET BY MOUTH EVERY DAY    docusate sodium (COLACE PO) Take  by mouth daily.  atorvastatin (LIPITOR) 80 mg tablet Take 1 Tab by mouth nightly.  VIRT-CAPS 1 mg capsule TK ONE C PO  DAILY    aspirin delayed-release (ECOTRIN) 325 mg tablet Take 325 mg by mouth daily. Indications: myocardial infarction prevention    tamsulosin (FLOMAX) 0.4 mg capsule TAKE 1 CAPSULE BY MOUTH EVERY DAY    sevelamer carbonate (RENVELA) 800 mg tab tab Take 1,600 mg by mouth three (3) times daily (with meals).  allopurinol (ZYLOPRIM) 100 mg tablet Take 100 mg by mouth Three (3) times a week. Mondays Wednesday Fridays     No current facility-administered medications on file prior to visit. CARDIOLOGY STUDIES TO DATE:  5/14 echo normal lvef, mild MR and tr without pul htn. Aortic root 3.8cm  2010 normal lexiscan cardiolyte    Chief Complaint   Patient presents with    Follow-up     HPI :  From a cardiac standpoint he is doing just fine. He is losing weight and turns out according to his daughter-in-law that part of the reason is as he wants to eat things that are good for him so she restricts him and therefore he does not eat enough of the things that are good for him. No cardiac complaints and blood pressure is doing fine.   His primary care is following his lipids still has occasional problems with dependent lower extremity edema. He has been tolerating dialysis without difficulty. 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 Hawthorne Code Bradycardia 12/21/2015    12/15 lopressor d/neela by Dr Adonis Barton, was taking 100 mg will start 25 mg and watch for tamara     CAD (coronary artery disease)     Chronic kidney disease     on dialysis  M-W F    Coronary atherosclerosis of native coronary artery     Stable symptoms    Essential hypertension, benign     Controlled    Hypercholesteremia     Mitral valve disorders(424.0)     Nausea & vomiting     Other and unspecified hyperlipidemia     3/12 Low density lipoproteins (LDLs) are at goal, triglycerides are at goal, high density lipoproteins (HDLs) are at goal    Postsurgical aortocoronary bypass status 1991    Prostate disease        GENERAL ROS:  A comprehensive review of systems was negative except for that written in the HPI.     Visit Vitals  /70 (BP 1 Location: Left lower arm, BP Patient Position: Sitting, BP Cuff Size: Adult)   Pulse (!) 58   Resp 18   Ht 5' 6\" (1.676 m)   Wt 128 lb (58.1 kg)   BMI 20.66 kg/m²       Wt Readings from Last 3 Encounters:   06/15/22 128 lb (58.1 kg)   12/01/21 138 lb 6.4 oz (62.8 kg)   10/26/21 135 lb (61.2 kg)            BP Readings from Last 3 Encounters:   06/15/22 138/70   12/01/21 (!) 170/60   05/14/21 130/80       PHYSICAL EXAM  General appearance: alert, cooperative, no distress, appears stated age  Neurologic: Alert and oriented X 3  Neck: supple, symmetrical, trachea midline, no adenopathy, no carotid bruit and no JVD  Lungs: clear to auscultation bilaterally  Heart: regular rate and rhythm, S1, S2 normal, no murmur, click, rub or gallop  Extremities: edema tr pedal    Lab Results   Component Value Date/Time    Cholesterol, total 112 08/08/2016 06:53 AM    Cholesterol, total 116 07/11/2016 07:18 AM    Cholesterol, total 116 05/31/2016 07:00 AM    Cholesterol, total 125 04/11/2016 08:02 AM    Cholesterol, total 115 03/07/2016 06:48 AM    HDL Cholesterol 62 (H) 08/08/2016 06:53 AM    HDL Cholesterol 63 (H) 07/11/2016 07:18 AM    HDL Cholesterol 69 (H) 05/31/2016 07:00 AM    HDL Cholesterol 58 04/11/2016 08:02 AM    HDL Cholesterol 57 03/07/2016 06:48 AM    LDL, calculated 35.2 08/08/2016 06:53 AM    LDL, calculated 35.4 07/11/2016 07:18 AM    LDL, calculated 32.4 05/31/2016 07:00 AM    LDL, calculated 40.2 04/11/2016 08:02 AM    LDL, calculated 39.6 03/07/2016 06:48 AM    Triglyceride 74 08/08/2016 06:53 AM    Triglyceride 88 07/11/2016 07:18 AM    Triglyceride 73 05/31/2016 07:00 AM    Triglyceride 134 04/11/2016 08:02 AM    Triglyceride 92 03/07/2016 06:48 AM    CHOL/HDL Ratio 1.8 08/08/2016 06:53 AM    CHOL/HDL Ratio 1.8 07/11/2016 07:18 AM    CHOL/HDL Ratio 1.7 05/31/2016 07:00 AM    CHOL/HDL Ratio 2.2 04/11/2016 08:02 AM    CHOL/HDL Ratio 2.0 03/07/2016 06:48 AM     ASSESSMENT :      He is stable and I think asymptomatic, well compensated on a good medical regimen and needs no cardiac testing at this time. current treatment plan is effective, no change in therapy  lab results and schedule of future lab studies reviewed with patient  reviewed diet, exercise and weight control    Encounter Diagnoses   Name Primary?     Atherosclerosis of native coronary artery of native heart without angina pectoris Yes    Essential hypertension     ESRD (end stage renal disease) ([facility] [address]     Mixed hyperlipidemia      Orders Placed This Encounter    brimonidine (Omaira Castles) 0.2 % ophthalmic solution       Follow-up and Dispositions    · Return in about 6 months (around 12/15/2022). Olivia Grace MD  6/15/2022  Please note that this dictation was completed with [employer], the computer voice recognition software. Quite often unanticipated grammatical, syntax, homophones, and other interpretive errors are inadvertently transcribed by the computer software. Please disregard these errors. Please excuse any errors that have escaped final proofreading. Thank you.

## 2022-07-21 DIAGNOSIS — E78.2 MIXED HYPERLIPIDEMIA: ICD-10-CM

## 2022-07-21 RX ORDER — METOPROLOL SUCCINATE 25 MG/1
TABLET, EXTENDED RELEASE ORAL
Qty: 90 TABLET | Refills: 3 | Status: SHIPPED | OUTPATIENT
Start: 2022-07-21

## 2022-09-01 DIAGNOSIS — I10 ESSENTIAL HYPERTENSION: ICD-10-CM

## 2022-09-01 DIAGNOSIS — E78.2 MIXED HYPERLIPIDEMIA: Primary | ICD-10-CM

## 2022-09-02 NOTE — TELEPHONE ENCOUNTER
Looks like this has not been filled since 2018 and do not have labs. Sent pt message in [place]. Also called patient. LM on  requesting call back. Will verify patient still taking atorvastatin 80 mg nightly. If so we can send refill now, but he will need to get fasting labs drawn at his earliest convenience. We can order labs.

## 2022-09-07 RX ORDER — ATORVASTATIN CALCIUM 80 MG/1
TABLET, FILM COATED ORAL
Qty: 30 TABLET | OUTPATIENT
Start: 2022-09-07

## 2022-09-12 RX ORDER — ATORVASTATIN CALCIUM 80 MG/1
80 TABLET, FILM COATED ORAL
Qty: 90 TABLET | Refills: 0 | Status: SHIPPED | OUTPATIENT
Start: 2022-09-12 | End: 2022-10-21

## 2022-09-12 RX ORDER — ATORVASTATIN CALCIUM 80 MG/1
80 TABLET, FILM COATED ORAL
Qty: 30 TABLET | Refills: 0 | Status: SHIPPED | OUTPATIENT
Start: 2022-09-12 | End: 2022-09-12 | Stop reason: SDUPTHER

## 2022-09-12 NOTE — TELEPHONE ENCOUNTER
Called pt again. Spoke with Angelica Brady. She said patient has been taking atorvastatin 80 mg, she had asked refill request be sent to Dr. Inderjit Giordano, but pharmacy had been sending refill request to a doctor in another city who kept filling it for 4-6 years, but just now refused it. I told her we will send in rx, but do need pt to get fasting labs. Lab order placed. She agreed and denied further questions or concerns. Requested Prescriptions     Signed Prescriptions Disp Refills    atorvastatin (LIPITOR) 80 mg tablet 90 Tablet 0     Sig: Take 1 Tablet by mouth nightly. Take 1 Tab by mouth nightly. NEED LABS     Authorizing Provider: Stephen Qureshi     Ordering User: Erica Mcarthur     Refused Prescriptions Disp Refills    atorvastatin (LIPITOR) 80 mg tablet [Pharmacy Med Name: ATORVASTATIN 80MG TABLETS] 30 Tablet      Sig: TAKE 1 TABLET BY MOUTH EVERY NIGHT AT BEDTIME     Refused By: Erica Mcarthur     Reason for Refusal: Have pt. call me      Originally sent rx for 30 days, but resent for 90 days after speaking to Angelica Brady.    Per Dr. Carnell Sever verbal orders

## 2022-09-16 ENCOUNTER — TELEPHONE (OUTPATIENT)
Dept: CARDIOLOGY CLINIC | Age: 87
End: 2022-09-16

## 2022-09-16 NOTE — TELEPHONE ENCOUNTER
----- Message from Manish Ibrahim MD sent at 9/16/2022  8:59 AM EDT -----  Chol is great.  Stay on meds

## 2022-09-19 DIAGNOSIS — E78.2 MIXED HYPERLIPIDEMIA: ICD-10-CM

## 2022-09-19 DIAGNOSIS — I10 ESSENTIAL HYPERTENSION: ICD-10-CM

## 2022-09-19 RX ORDER — LISINOPRIL 20 MG/1
TABLET ORAL
Qty: 90 TABLET | Refills: 1 | Status: SHIPPED | OUTPATIENT
Start: 2022-09-19

## 2022-09-19 RX ORDER — AMLODIPINE BESYLATE 10 MG/1
TABLET ORAL
Qty: 90 TABLET | Refills: 1 | Status: SHIPPED | OUTPATIENT
Start: 2022-09-19

## 2022-09-19 NOTE — TELEPHONE ENCOUNTER
Requested Prescriptions     Signed Prescriptions Disp Refills    lisinopriL (PRINIVIL, ZESTRIL) 20 mg tablet 90 Tablet 1     Sig: TAKE 1 TABLET BY MOUTH EVERY DAY     Authorizing Provider: Milton Hartman     Ordering User: DAVID Youssef    amLODIPine (NORVASC) 10 mg tablet 90 Tablet 1     Sig: TAKE 1 TABLET BY MOUTH DAILY FOR HIGH BLOOD PRESSURE     Authorizing Provider: Milton Hartman     Ordering User: Alma Tatum verbal orders

## 2022-12-14 ENCOUNTER — OFFICE VISIT (OUTPATIENT)
Dept: CARDIOLOGY CLINIC | Age: 87
End: 2022-12-14
Payer: COMMERCIAL

## 2022-12-14 VITALS
DIASTOLIC BLOOD PRESSURE: 60 MMHG | SYSTOLIC BLOOD PRESSURE: 138 MMHG | HEIGHT: 66 IN | BODY MASS INDEX: 20.41 KG/M2 | WEIGHT: 127 LBS | HEART RATE: 52 BPM | OXYGEN SATURATION: 99 % | RESPIRATION RATE: 16 BRPM

## 2022-12-14 DIAGNOSIS — N18.6 ESRD (END STAGE RENAL DISEASE) (HCC): ICD-10-CM

## 2022-12-14 DIAGNOSIS — E78.2 MIXED HYPERLIPIDEMIA: ICD-10-CM

## 2022-12-14 DIAGNOSIS — I25.10 ATHEROSCLEROSIS OF NATIVE CORONARY ARTERY OF NATIVE HEART WITHOUT ANGINA PECTORIS: Primary | ICD-10-CM

## 2022-12-14 DIAGNOSIS — I10 ESSENTIAL HYPERTENSION: ICD-10-CM

## 2022-12-14 NOTE — PROGRESS NOTES
HISTORY OF PRESENT ILLNESS  Macie Pimentel is a 81 y.o. male     SUMMARY:   Problem List  Date Reviewed: 12/14/2022            Codes Class Noted    Bradycardia ICD-10-CM: R00.1  ICD-9-CM: 427.89  12/21/2015    Overview Addendum 1/7/2016 10:55 AM by Jossy Cortez MD     12/15 lopressor d/neela by Dr Dahlia Monge, was taking 100 mg  will start 25 mg and watch for tamara  1/16 tolerating toprol xl 25/day             ESRD (end stage renal disease) ([facility] [address] ICD-10-CM: N18.6  ICD-9-CM: 585.6  12/14/2015    Overview Signed 2/2/2017  8:55 AM by Jossy Cortez MD     HD since 9/16 approx             Undiagnosed cardiac murmurs ICD-10-CM: R01.1  ICD-9-CM: 785.2  5/14/2014    Overview Signed 5/14/2014  3:48 PM by Jossy Cortez MD     r/o AS             Coronary atherosclerosis of native coronary artery ICD-10-CM: I25.10  ICD-9-CM: 414.01  Unknown    Overview Addendum 5/9/2017  2:06 PM by Juan Thomas MD     Stable symptoms  1991 single vessel cabg after failed pci, details unknown             HLD (hyperlipidemia) ICD-10-CM: E78.5  ICD-9-CM: 272.4  Unknown    Overview Addendum 2/2/2017  8:46 AM by Jossy Cortez MD     8/16 Gerlene Grams; 5/16 LDL32; 12/15 LDL21; 6/15  Low density lipoproteins (LDLs) are at goal, triglycerides are at goal, high density lipoproteins (HDLs) are at goal             Postsurgical aortocoronary bypass status ICD-10-CM: Z95.1  ICD-9-CM: V45.81  Unknown        Essential hypertension ICD-10-CM: I10  ICD-9-CM: 401.9  Unknown    Overview Addendum 2/2/2017  8:52 AM by Jossy Cortez MD     2/17 incr norvasc to 10;   F/U Dr. Dahlia Monge             Mitral regurgitation ICD-10-CM: I34.0  ICD-9-CM: 424.0  Unknown    Overview Addendum 5/9/2017  7:15 AM by Juan Thomas MD     2/10 trace MR;   5/14 echo normal lvef, mild MR and tr without pul htn.  Aortic root 3.8cm             BPH (benign prostatic hyperplasia) ICD-10-CM: N40.0  ICD-9-CM: 600.00  3/14/2013        Lower urinary tract symptoms (LUTS) ICD-10-CM: R39.9  ICD-9-CM: 788.99  3/14/2013        Elevated prostate specific antigen (PSA) ICD-10-CM: R97.20  ICD-9-CM: 790.93  3/14/2013        Renal insufficiency ICD-10-CM: N28.9  ICD-9-CM: 593.9  3/14/2013           Current Outpatient Medications on File Prior to Visit   Medication Sig    atorvastatin (LIPITOR) 80 mg tablet TAKE 1 TABLET BY MOUTH EVERY NIGHT    lisinopriL (PRINIVIL, ZESTRIL) 20 mg tablet TAKE 1 TABLET BY MOUTH EVERY DAY    amLODIPine (NORVASC) 10 mg tablet TAKE 1 TABLET BY MOUTH DAILY FOR HIGH BLOOD PRESSURE    metoprolol succinate (TOPROL-XL) 25 mg XL tablet TAKE 1 TABLET BY MOUTH EVERY DAY    brimonidine (ALPHAGAN) 0.2 % ophthalmic solution INSTILL 1 DROP IN LEFT EYE TWICE DAILY    docusate sodium (COLACE PO) Take  by mouth daily. VIRT-CAPS 1 mg capsule TK ONE C PO  DAILY    aspirin delayed-release 325 mg tablet Take 325 mg by mouth daily. Indications: treatment to prevent a heart attack    tamsulosin (FLOMAX) 0.4 mg capsule TAKE 1 CAPSULE BY MOUTH EVERY DAY    sevelamer carbonate (RENVELA) 800 mg tab tab Take 1,600 mg by mouth three (3) times daily (with meals). allopurinol (ZYLOPRIM) 100 mg tablet Take 100 mg by mouth Three (3) times a week. Mondays Wednesday Fridays     No current facility-administered medications on file prior to visit. CARDIOLOGY STUDIES TO DATE:  5/14 echo normal lvef, mild MR and tr without pul htn. Aortic root 3.8cm  2010 normal lexiscan cardiolyte    Chief Complaint   Patient presents with    Follow-up     HPI :  He continues to do well from a cardiac standpoint with no complaints or problems with his medications other than some mild dependent edema. Is been tolerating dialysis without difficulty and his primary care is following his lipids.   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 Rayne Carlson    Bradycardia 12/21/2015    12/15 lopressor d/neela by Dr Rayne Carlson, was taking 100 mg will start 25 mg and watch for tamara     CAD (coronary artery disease)     Chronic kidney disease     on dialysis  M-W F    Coronary atherosclerosis of native coronary artery     Stable symptoms    Essential hypertension, benign     Controlled    Hypercholesteremia     Mitral valve disorders(424.0)     Nausea & vomiting     Other and unspecified hyperlipidemia     3/12 Low density lipoproteins (LDLs) are at goal, triglycerides are at goal, high density lipoproteins (HDLs) are at goal    Postsurgical aortocoronary bypass status 1991    Prostate disease        GENERAL ROS:  A comprehensive review of systems was negative except for that written in the HPI.     Visit Vitals  /60   Pulse (!) 52   Resp 16   Ht 5' 6\" (1.676 m)   Wt 127 lb (57.6 kg)   SpO2 99%   BMI 20.50 kg/m²       Wt Readings from Last 3 Encounters:   12/14/22 127 lb (57.6 kg)   06/15/22 128 lb (58.1 kg)   12/01/21 138 lb 6.4 oz (62.8 kg)            BP Readings from Last 3 Encounters:   12/14/22 138/60   06/15/22 138/70   12/01/21 (!) 170/60       PHYSICAL EXAM  General appearance: alert, cooperative, no distress, appears stated age  Neurologic: Alert and oriented X 3  Neck: supple, symmetrical, trachea midline, no adenopathy, no carotid bruit, and no JVD  Lungs: clear to auscultation bilaterally  Heart: regular rate and rhythm, S1, S2 normal, no murmur, click, rub or gallop  Extremities: extremities normal, atraumatic, no cyanosis or edema    Lab Results   Component Value Date/Time    Cholesterol, total 107 09/15/2022 01:25 PM    Cholesterol, total 112 08/08/2016 06:53 AM    Cholesterol, total 116 07/11/2016 07:18 AM    Cholesterol, total 116 05/31/2016 07:00 AM    Cholesterol, total 125 04/11/2016 08:02 AM    HDL Cholesterol 78 09/15/2022 01:25 PM    HDL Cholesterol 62 (H) 08/08/2016 06:53 AM    HDL Cholesterol 63 (H) 07/11/2016 07:18 AM    HDL Cholesterol 69 (H) 05/31/2016 07:00 AM    HDL Cholesterol 58 04/11/2016 08:02 AM    LDL, calculated 19.6 09/15/2022 01:25 PM    LDL, calculated 35.2 08/08/2016 06:53 AM    LDL, calculated 35.4 07/11/2016 07:18 AM    LDL, calculated 32.4 05/31/2016 07:00 AM    LDL, calculated 40.2 04/11/2016 08:02 AM    Triglyceride 47 09/15/2022 01:25 PM    Triglyceride 74 08/08/2016 06:53 AM    Triglyceride 88 07/11/2016 07:18 AM    Triglyceride 73 05/31/2016 07:00 AM    Triglyceride 134 04/11/2016 08:02 AM    CHOL/HDL Ratio 1.4 09/15/2022 01:25 PM    CHOL/HDL Ratio 1.8 08/08/2016 06:53 AM    CHOL/HDL Ratio 1.8 07/11/2016 07:18 AM    CHOL/HDL Ratio 1.7 05/31/2016 07:00 AM    CHOL/HDL Ratio 2.2 04/11/2016 08:02 AM     ASSESSMENT :      He is stable asymptomatic and well compensated on a good medical regimen and needs no cardiac testing at this time. current treatment plan is effective, no change in therapy  lab results and schedule of future lab studies reviewed with patient  reviewed diet, exercise and weight control    Encounter Diagnoses   Name Primary? Atherosclerosis of native coronary artery of native heart without angina pectoris Yes    ESRD (end stage renal disease) ([facility] [address]     Essential hypertension     Mixed hyperlipidemia      No orders of the defined types were placed in this encounter. Nathan Bradley MD  12/14/2022  Please note that this dictation was completed with [employer], the [employer] voice recognition software.   Quite often unanticipated grammatical, syntax, homophones, and other interpretive errors are inadvertently transcribed by the computer software. Please disregard these errors. Please excuse any errors that have escaped final proofreading. Thank you.

## 2022-12-29 DIAGNOSIS — E78.2 MIXED HYPERLIPIDEMIA: ICD-10-CM

## 2022-12-29 DIAGNOSIS — I10 ESSENTIAL HYPERTENSION: ICD-10-CM

## 2022-12-29 RX ORDER — ATORVASTATIN CALCIUM 80 MG/1
80 TABLET, FILM COATED ORAL
Qty: 90 TABLET | Refills: 3 | Status: CANCELLED | OUTPATIENT
Start: 2022-12-29 | End: 2023-03-29

## 2022-12-29 RX ORDER — AMLODIPINE BESYLATE 10 MG/1
10 TABLET ORAL DAILY
Qty: 90 TABLET | Refills: 3 | Status: SHIPPED | OUTPATIENT
Start: 2022-12-29

## 2022-12-29 RX ORDER — ATORVASTATIN CALCIUM 80 MG/1
80 TABLET, FILM COATED ORAL
Qty: 90 TABLET | Refills: 3 | Status: SHIPPED | OUTPATIENT
Start: 2022-12-29

## 2022-12-29 RX ORDER — AMLODIPINE BESYLATE 10 MG/1
10 TABLET ORAL DAILY
Qty: 90 TABLET | Refills: 3 | Status: CANCELLED | OUTPATIENT
Start: 2022-12-29 | End: 2023-03-29

## 2022-12-29 RX ORDER — METOPROLOL SUCCINATE 25 MG/1
25 TABLET, EXTENDED RELEASE ORAL DAILY
Qty: 90 TABLET | Refills: 3 | Status: SHIPPED | OUTPATIENT
Start: 2022-12-29

## 2022-12-29 RX ORDER — LISINOPRIL 20 MG/1
20 TABLET ORAL DAILY
Qty: 90 TABLET | Refills: 3 | Status: SHIPPED | OUTPATIENT
Start: 2022-12-29

## 2022-12-29 RX ORDER — METOPROLOL SUCCINATE 25 MG/1
25 TABLET, EXTENDED RELEASE ORAL DAILY
Qty: 90 TABLET | Refills: 3 | Status: CANCELLED | OUTPATIENT
Start: 2022-12-29 | End: 2023-03-29

## 2022-12-29 NOTE — TELEPHONE ENCOUNTER
Requested Prescriptions     Signed Prescriptions Disp Refills    amLODIPine (NORVASC) 10 mg tablet 90 Tablet 3     Sig: Take 1 Tablet by mouth daily. FOR HIGH BLOOD PRESSURE     Authorizing Provider: Neva Reese     Ordering User: Maryam KITCHEN    atorvastatin (LIPITOR) 80 mg tablet 90 Tablet 3     Sig: Take 1 Tablet by mouth nightly. Authorizing Provider: Neva Reese     Ordering User: Ailyn Arriaza    metoprolol succinate (TOPROL-XL) 25 mg XL tablet 90 Tablet 3     Sig: Take 1 Tablet by mouth daily.      Authorizing Provider: Neva Reese     Ordering User: Ailyn Arriaza    Per Dr. Janet Harris verbal orders

## 2022-12-29 NOTE — TELEPHONE ENCOUNTER
Requested Prescriptions     Signed Prescriptions Disp Refills    lisinopriL (PRINIVIL, ZESTRIL) 20 mg tablet 90 Tablet 3     Sig: Take 1 Tablet by mouth daily.      Authorizing Provider: Vee Molina     Ordering User: Donn Lieberman    Per Dr. Jessie Simpson verbal orders

## 2023-01-06 DIAGNOSIS — I10 ESSENTIAL HYPERTENSION: ICD-10-CM

## 2023-01-06 DIAGNOSIS — E78.2 MIXED HYPERLIPIDEMIA: ICD-10-CM

## 2023-01-06 RX ORDER — ATORVASTATIN CALCIUM 80 MG/1
80 TABLET, FILM COATED ORAL
Qty: 90 TABLET | Refills: 3 | Status: SHIPPED | OUTPATIENT
Start: 2023-01-06

## 2023-01-06 RX ORDER — METOPROLOL SUCCINATE 25 MG/1
25 TABLET, EXTENDED RELEASE ORAL DAILY
Qty: 90 TABLET | Refills: 3 | Status: SHIPPED | OUTPATIENT
Start: 2023-01-06

## 2023-01-06 RX ORDER — AMLODIPINE BESYLATE 10 MG/1
10 TABLET ORAL DAILY
Qty: 90 TABLET | Refills: 3 | Status: SHIPPED | OUTPATIENT
Start: 2023-01-06

## 2023-01-06 RX ORDER — LISINOPRIL 20 MG/1
20 TABLET ORAL DAILY
Qty: 90 TABLET | Refills: 3 | Status: SHIPPED | OUTPATIENT
Start: 2023-01-06

## 2023-01-06 NOTE — TELEPHONE ENCOUNTER
Requested Prescriptions     Signed Prescriptions Disp Refills    lisinopriL (PRINIVIL, ZESTRIL) 20 mg tablet 90 Tablet 3     Sig: Take 1 Tablet by mouth daily. Authorizing Provider: Vince Saucedo     Ordering User: Jacek Chroman L    amLODIPine (NORVASC) 10 mg tablet 90 Tablet 3     Sig: Take 1 Tablet by mouth daily. FOR HIGH BLOOD PRESSURE     Authorizing Provider: Vince Saucedo     Ordering User: Jacek Chroman L    atorvastatin (LIPITOR) 80 mg tablet 90 Tablet 3     Sig: Take 1 Tablet by mouth nightly. Authorizing Provider: Vince Saucedo     Ordering User: Jaz Bro    metoprolol succinate (TOPROL-XL) 25 mg XL tablet 90 Tablet 3     Sig: Take 1 Tablet by mouth daily.      Authorizing Provider: Vince Saucedo     Ordering User: Jaz Bro       Per Dr. Enrique De Souza verbal orders

## 2023-05-07 NOTE — PROGRESS NOTES
HISTORY OF PRESENT ILLNESS  Queenie Miller is a 81 y.o. male     SUMMARY:   Problem List  Date Reviewed: 9/12/2019          Codes Class Noted    Bradycardia ICD-10-CM: R00.1  ICD-9-CM: 427.89  12/21/2015    Overview Addendum 1/7/2016 10:55 AM by Mark Hopper MD     12/15 lopressor d/neela by Dr Samuel Anthony, was taking 100 mg  will start 25 mg and watch for tamara  1/16 tolerating toprol xl 25/day             ESRD (end stage renal disease) ([facility] [address] ICD-10-CM: N18.6  ICD-9-CM: 585.6  12/14/2015    Overview Signed 2/2/2017  8:55 AM by Mark Hopper MD     HD since 9/16 approx             Undiagnosed cardiac murmurs ICD-10-CM: R01.1  ICD-9-CM: 785.2  5/14/2014    Overview Signed 5/14/2014  3:48 PM by Mark Hopper MD     r/o AS             Coronary atherosclerosis of native coronary artery ICD-10-CM: I25.10  ICD-9-CM: 414.01  Unknown    Overview Addendum 5/9/2017  2:06 PM by Mario Alberto Fitzgerald MD     Stable symptoms  1991 single vessel cabg after failed pci, details unknown             HLD (hyperlipidemia) ICD-10-CM: E78.5  ICD-9-CM: 272.4  Unknown    Overview Addendum 2/2/2017  8:46 AM by Mark Hopper MD     8/16 Choco Purl; 5/16 LDL32; 12/15 LDL21; 6/15  Low density lipoproteins (LDLs) are at goal, triglycerides are at goal, high density lipoproteins (HDLs) are at goal             Postsurgical aortocoronary bypass status ICD-10-CM: Z95.1  ICD-9-CM: V45.81  Unknown        Essential hypertension ICD-10-CM: I10  ICD-9-CM: 401.9  Unknown    Overview Addendum 2/2/2017  8:52 AM by Mark Hopper MD     2/17 incr norvasc to 10;   F/U Dr. Samuel Anthony             Mitral regurgitation ICD-10-CM: I34.0  ICD-9-CM: 424.0  Unknown    Overview Addendum 5/9/2017  7:15 AM by Mario Alberto Fitzgerald MD     2/10 trace MR;   5/14 echo normal lvef, mild MR and tr without pul htn.  Aortic root 3.8cm             BPH (benign prostatic hyperplasia) ICD-10-CM: N40.0  ICD-9-CM: 600.00  3/14/2013        Lower urinary tract symptoms (LUTS) ICD-10-CM: R39.9  ICD-9-CM: 788.99  3/14/2013        Elevated prostate specific antigen (PSA) ICD-10-CM: R97.20  ICD-9-CM: 790.93  3/14/2013        Renal insufficiency ICD-10-CM: N28.9  ICD-9-CM: 593.9  3/14/2013              Current Outpatient Medications on File Prior to Visit   Medication Sig    amLODIPine (NORVASC) 10 mg tablet TAKE 1 TABLET BY MOUTH DAILY FOR HIGH BLOOD PRESSURE    metoprolol succinate (TOPROL-XL) 25 mg XL tablet TAKE 1 TABLET BY MOUTH EVERY DAY    lisinopril (PRINIVIL, ZESTRIL) 20 mg tablet TAKE 1 TABLET BY MOUTH EVERY DAY    docusate sodium (COLACE PO) Take  by mouth daily.  atorvastatin (LIPITOR) 80 mg tablet Take 1 Tab by mouth nightly.  ondansetron (ZOFRAN ODT) 4 mg disintegrating tablet Take 1 Tab by mouth every eight (8) hours as needed for Nausea.  VIRT-CAPS 1 mg capsule TK ONE C PO  DAILY    aspirin delayed-release (ECOTRIN) 325 mg tablet Take 325 mg by mouth daily. Indications: myocardial infarction prevention    tamsulosin (FLOMAX) 0.4 mg capsule TAKE 1 CAPSULE BY MOUTH EVERY DAY    sevelamer carbonate (RENVELA) 800 mg tab tab Take 1,600 mg by mouth three (3) times daily (with meals).  allopurinol (ZYLOPRIM) 100 mg tablet Take 100 mg by mouth Three (3) times a week. Mondays Wednesday Fridays     No current facility-administered medications on file prior to visit. CARDIOLOGY STUDIES TO DATE:  5/14 echo normal lvef, mild MR and tr without pul htn. Aortic root 3.8cm  2010 normal lexiscan cardiolyte    Chief Complaint   Patient presents with    Coronary Artery Disease     HPI :  Mr. Justice Lynne is doing pretty well. He still has trouble with dependent ankle edema and inconsistently wears support hose and is not getting much in the way of activity. He inconsistently controls his salt intake. No problems with his medications or with dialysis. He recently had to have a new AV fistula and that went uneventfully.         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 Brigid Richardson Bradycardia 12/21/2015    12/15 lopressor d/neela by Dr Rome Rosenbaum, was taking 100 mg will start 25 mg and watch for tamara     CAD (coronary artery disease)     Chronic kidney disease     on dialysis  M-W F    Coronary atherosclerosis of native coronary artery     Stable symptoms    Essential hypertension, benign     Controlled    Hypercholesteremia     Mitral valve disorders(424.0)     Nausea & vomiting     Other and unspecified hyperlipidemia     3/12 Low density lipoproteins (LDLs) are at goal, triglycerides are at goal, high density lipoproteins (HDLs) are at goal    Postsurgical aortocoronary bypass status 1991    Prostate disease        GENERAL ROS:  A comprehensive review of systems was negative except for that written in the HPI.     Visit Vitals  /75 (BP 1 Location: Right leg, BP Patient Position: Sitting)   Pulse 71   Resp 16   Ht 5' 6\" (1.676 m)   Wt 139 lb (63 kg)   SpO2 100%   BMI 22.44 kg/m²       Wt Readings from Last 3 Encounters:   09/13/19 139 lb (63 kg)   02/22/19 145 lb 3.2 oz (65.9 kg)   08/22/18 156 lb (70.8 kg)            BP Readings from Last 3 Encounters:   09/13/19 110/75   02/22/19 148/52   08/22/18 140/70       PHYSICAL EXAM  General appearance: alert, cooperative, no distress, appears stated age  Neurologic: Alert and oriented X 3  Neck: supple, symmetrical, trachea midline, no adenopathy, no carotid bruit and no JVD  Lungs: clear to auscultation bilaterally  Heart: regular rate and rhythm, S1, S2 normal, no murmur, click, rub or gallop  Extremities: edema 1+ ankle    Lab Results   Component Value Date/Time    Cholesterol, total 112 08/08/2016 06:53 AM    Cholesterol, total 116 07/11/2016 07:18 AM    Cholesterol, total 116 05/31/2016 07:00 AM    Cholesterol, total 125 04/11/2016 08:02 AM    Cholesterol, total 115 03/07/2016 06:48 AM    HDL Cholesterol 62 (H) 08/08/2016 06:53 AM    HDL Cholesterol 63 (H) 07/11/2016 07:18 AM    HDL Cholesterol 69 (H) 05/31/2016 07:00 AM    HDL Cholesterol 58 04/11/2016 08:02 AM    HDL Cholesterol 57 03/07/2016 06:48 AM    LDL, calculated 35.2 08/08/2016 06:53 AM    LDL, calculated 35.4 07/11/2016 07:18 AM    LDL, calculated 32.4 05/31/2016 07:00 AM    LDL, calculated 40.2 04/11/2016 08:02 AM    LDL, calculated 39.6 03/07/2016 06:48 AM    Triglyceride 74 08/08/2016 06:53 AM    Triglyceride 88 07/11/2016 07:18 AM    Triglyceride 73 05/31/2016 07:00 AM    Triglyceride 134 04/11/2016 08:02 AM    Triglyceride 92 03/07/2016 06:48 AM    CHOL/HDL Ratio 1.8 08/08/2016 06:53 AM    CHOL/HDL Ratio 1.8 07/11/2016 07:18 AM    CHOL/HDL Ratio 1.7 05/31/2016 07:00 AM    CHOL/HDL Ratio 2.2 04/11/2016 08:02 AM    CHOL/HDL Ratio 2.0 03/07/2016 06:48 AM     ASSESSMENT  Mr. Shaniqua Quintanilla is stable and asymptomatic I think, well compensated on a good medical regimen and needs no cardiac testing at this time. current treatment plan is effective, no change in therapy  lab results and schedule of future lab studies reviewed with patient  reviewed diet, exercise and weight control    Encounter Diagnoses   Name Primary?  Atherosclerosis of native coronary artery of native heart without angina pectoris Yes    ESR[facility]end stage renal disease) ([facility] [address]     Bradycardia     Mixed hyperlipidemia     Essential hypertension      No orders of the defined types were placed in this encounter. Follow-up and Dispositions    · Return in about 6 months (around 3/13/2020).          Angy Cordero Klyer Das MD  9/13/2019 ambulatory

## 2023-06-06 ENCOUNTER — TELEPHONE (OUTPATIENT)
Age: 88
End: 2023-06-06

## 2023-06-06 DIAGNOSIS — I10 ESSENTIAL (PRIMARY) HYPERTENSION: Primary | ICD-10-CM

## 2023-06-06 NOTE — TELEPHONE ENCOUNTER
[facility] on [facility] [address]  would like the patient amlodipine 10 mg to be cut to 5 mg. They want to reduce the medicine because the patient 's blood pressure is dropping to low.     [phone] Nghia Virk daughter in law

## 2023-06-07 RX ORDER — AMLODIPINE BESYLATE 5 MG/1
5 TABLET ORAL DAILY
Qty: 90 TABLET | Refills: 0 | Status: SHIPPED | OUTPATIENT
Start: 2023-06-07

## 2023-06-07 NOTE — TELEPHONE ENCOUNTER
Rain Sparrow. Verified patient's identity with two identifiers. Notified her Dr. Tonny Birmingham said this was okay. She requested new rx for 5 mg tabs be sent to [facility]. They will switch to mail order next time. Rx sent. Nghia Virk denied further questions or concerns. Requested Prescriptions     Signed Prescriptions Disp Refills    amLODIPine (NORVASC) 5 MG tablet 90 tablet 0     Sig: Take 1 tablet by mouth daily     Authorizing Provider: Annie Simmons     Ordering User: Bryan Garcia    Per Dr. Petey Sifuentes verbal order.

## 2023-06-21 ENCOUNTER — OFFICE VISIT (OUTPATIENT)
Age: 88
End: 2023-06-21
Payer: MEDICARE

## 2023-06-21 VITALS
SYSTOLIC BLOOD PRESSURE: 188 MMHG | HEART RATE: 56 BPM | HEIGHT: 66 IN | DIASTOLIC BLOOD PRESSURE: 62 MMHG | WEIGHT: 133.6 LBS | OXYGEN SATURATION: 98 % | BODY MASS INDEX: 21.47 KG/M2

## 2023-06-21 DIAGNOSIS — N18.6 ESRD (END STAGE RENAL DISEASE) (HCC): ICD-10-CM

## 2023-06-21 DIAGNOSIS — I25.10 ATHEROSCLEROSIS OF NATIVE CORONARY ARTERY OF NATIVE HEART WITHOUT ANGINA PECTORIS: Primary | ICD-10-CM

## 2023-06-21 DIAGNOSIS — I34.0 MITRAL VALVE INSUFFICIENCY, UNSPECIFIED ETIOLOGY: ICD-10-CM

## 2023-06-21 DIAGNOSIS — E78.2 MIXED HYPERLIPIDEMIA: ICD-10-CM

## 2023-06-21 DIAGNOSIS — I10 ESSENTIAL HYPERTENSION: ICD-10-CM

## 2023-06-21 PROCEDURE — G8427 DOCREV CUR MEDS BY ELIG CLIN: HCPCS | Performed by: SPECIALIST

## 2023-06-21 PROCEDURE — G8420 CALC BMI NORM PARAMETERS: HCPCS | Performed by: SPECIALIST

## 2023-06-21 PROCEDURE — 1036F TOBACCO NON-USER: CPT | Performed by: SPECIALIST

## 2023-06-21 PROCEDURE — 1123F ACP DISCUSS/DSCN MKR DOCD: CPT | Performed by: SPECIALIST

## 2023-06-21 PROCEDURE — 99213 OFFICE O/P EST LOW 20 MIN: CPT | Performed by: SPECIALIST

## 2023-06-21 RX ORDER — MIDODRINE HYDROCHLORIDE 2.5 MG/1
TABLET ORAL
COMMUNITY
Start: 2023-01-16

## 2023-06-21 ASSESSMENT — PATIENT HEALTH QUESTIONNAIRE - PHQ9
SUM OF ALL RESPONSES TO PHQ QUESTIONS 1-9: 0
SUM OF ALL RESPONSES TO PHQ QUESTIONS 1-9: 0
SUM OF ALL RESPONSES TO PHQ9 QUESTIONS 1 & 2: 0
2. FEELING DOWN, DEPRESSED OR HOPELESS: 0
1. LITTLE INTEREST OR PLEASURE IN DOING THINGS: 0
SUM OF ALL RESPONSES TO PHQ QUESTIONS 1-9: 0
SUM OF ALL RESPONSES TO PHQ QUESTIONS 1-9: 0

## 2023-06-21 NOTE — PROGRESS NOTES
HISTORY OF PRESENT ILLNESS  Edinson Grande is a 81 y.o. male     SUMMARY:   Patient Active Problem List   Diagnosis    Coronary atherosclerosis of native coronary artery    Elevated prostate specific antigen (PSA)    Mitral regurgitation    BPH (benign prostatic hyperplasia)    ESRD (end stage renal disease) (HCC)    Bradycardia    Renal insufficiency    Undiagnosed cardiac murmurs    Lower urinary tract symptoms (LUTS)    HLD (hyperlipidemia)    Postsurgical aortocoronary bypass status    Essential hypertension       Current Outpatient Medications   Medication Sig Dispense Refill    midodrine (PROAMATINE) 2.5 MG tablet 1 tablet Orally ON MONDAY, WEDNESDAY, AND FRIDAY BEFORE DIALYSIS      amLODIPine (NORVASC) 5 MG tablet Take 1 tablet by mouth daily 90 tablet 0    allopurinol (ZYLOPRIM) 100 MG tablet Take 1 tablet by mouth three times a week      aspirin 325 MG EC tablet Take 1 tablet by mouth daily      atorvastatin (LIPITOR) 80 MG tablet Take 1 tablet by mouth nightly      brimonidine (ALPHAGAN) 0.2 % ophthalmic solution INSTILL 1 DROP IN LEFT EYE TWICE DAILY      lisinopril (PRINIVIL;ZESTRIL) 20 MG tablet Take 1 tablet by mouth daily      metoprolol succinate (TOPROL XL) 25 MG extended release tablet Take 1 tablet by mouth daily      sevelamer (RENVELA) 800 MG tablet Take 2 tablets by mouth 3 times daily (with meals)      tamsulosin (FLOMAX) 0.4 MG capsule Take 1 tablet by mouth daily       No current facility-administered medications for this visit. CARDIOLOGY STUDIES TO DATE:  5/14 echo normal lvef, mild MR and tr without pul htn. Aortic root 3.8cm  2010 normal lexiscan cardiolyte    Chief Complaint   Patient presents with    Annual Exam     Denies cardiac symptoms       HPI :  Cardiac wise he is doing quite well though he is having a lot of labile blood pressure. He has to take midodrine on his dialysis mornings and then he takes his meds afterwards spread out through the day.   He had

## 2024-01-07 DIAGNOSIS — I10 ESSENTIAL HYPERTENSION: ICD-10-CM

## 2024-01-07 DIAGNOSIS — E78.2 MIXED HYPERLIPIDEMIA: Primary | ICD-10-CM

## 2024-01-09 RX ORDER — LISINOPRIL 20 MG/1
20 TABLET ORAL DAILY
Qty: 90 TABLET | Refills: 1 | Status: SHIPPED | OUTPATIENT
Start: 2024-01-09

## 2024-01-09 RX ORDER — ATORVASTATIN CALCIUM 80 MG/1
80 TABLET, FILM COATED ORAL NIGHTLY
Qty: 90 TABLET | Refills: 1 | Status: SHIPPED | OUTPATIENT
Start: 2024-01-09

## 2024-01-09 RX ORDER — METOPROLOL SUCCINATE 25 MG/1
25 TABLET, EXTENDED RELEASE ORAL DAILY
Qty: 90 TABLET | Refills: 1 | Status: SHIPPED | OUTPATIENT
Start: 2024-01-09

## 2024-01-09 NOTE — TELEPHONE ENCOUNTER
Pt gets labs drawn with another provider, but need results. Faxed records request.    Requested Prescriptions     Signed Prescriptions Disp Refills    lisinopril (PRINIVIL;ZESTRIL) 20 MG tablet 90 tablet 1     Sig: TAKE 1 TABLET EVERY DAY     Authorizing Provider: AFTAB QUEEN III     Ordering User: KATE ANAYA    metoprolol succinate (TOPROL XL) 25 MG extended release tablet 90 tablet 1     Sig: TAKE 1 TABLET EVERY DAY     Authorizing Provider: AFTAB QUEEN III     Ordering User: KATE ANAYA    atorvastatin (LIPITOR) 80 MG tablet 90 tablet 1     Sig: TAKE 1 TABLET EVERY NIGHT     Authorizing Provider: AFTAB QUEEN III     Ordering User: KATE ANAYA    Per Dr. Queen's verbal order.

## 2024-02-22 DIAGNOSIS — I10 ESSENTIAL (PRIMARY) HYPERTENSION: ICD-10-CM

## 2024-02-23 RX ORDER — AMLODIPINE BESYLATE 5 MG/1
5 TABLET ORAL DAILY
Qty: 90 TABLET | Refills: 3 | Status: SHIPPED | OUTPATIENT
Start: 2024-02-23

## 2024-02-23 NOTE — TELEPHONE ENCOUNTER
Requested Prescriptions     Signed Prescriptions Disp Refills    amLODIPine (NORVASC) 5 MG tablet 90 tablet 3     Sig: TAKE 1 TABLET BY MOUTH DAILY     Authorizing Provider: AFTAB QUEEN III     Ordering User: FREDDIE DING      Future Appointments   Date Time Provider Department Center   6/27/2024  1:00 PM Aftab Queen III, MD [facility]      Per Verbal Order by MD/NP

## 2024-05-13 DIAGNOSIS — I10 ESSENTIAL (PRIMARY) HYPERTENSION: ICD-10-CM

## 2024-05-13 RX ORDER — AMLODIPINE BESYLATE 5 MG/1
5 TABLET ORAL DAILY
Qty: 90 TABLET | Refills: 3 | Status: SHIPPED | OUTPATIENT
Start: 2024-05-13

## 2024-05-13 NOTE — TELEPHONE ENCOUNTER
Requested Prescriptions     Signed Prescriptions Disp Refills    amLODIPine (NORVASC) 5 MG tablet 90 tablet 3     Sig: Take 1 tablet by mouth daily     Authorizing Provider: AFTAB QUEEN III     Ordering User: KATE ANAYA    Per Dr. Queen's verbal order.

## 2024-08-23 DIAGNOSIS — E78.2 MIXED HYPERLIPIDEMIA: ICD-10-CM

## 2024-08-23 DIAGNOSIS — I10 ESSENTIAL HYPERTENSION: ICD-10-CM

## 2024-08-23 RX ORDER — ATORVASTATIN CALCIUM 80 MG/1
80 TABLET, FILM COATED ORAL NIGHTLY
Qty: 90 TABLET | Refills: 3 | Status: SHIPPED | OUTPATIENT
Start: 2024-08-23

## 2024-08-23 RX ORDER — METOPROLOL SUCCINATE 25 MG/1
25 TABLET, EXTENDED RELEASE ORAL DAILY
Qty: 90 TABLET | Refills: 3 | Status: SHIPPED | OUTPATIENT
Start: 2024-08-23

## 2024-08-23 RX ORDER — LISINOPRIL 20 MG/1
20 TABLET ORAL DAILY
Qty: 90 TABLET | Refills: 3 | Status: SHIPPED | OUTPATIENT
Start: 2024-08-23

## 2024-08-23 NOTE — TELEPHONE ENCOUNTER
Requested Prescriptions     Signed Prescriptions Disp Refills    atorvastatin (LIPITOR) 80 MG tablet 90 tablet 3     Sig: TAKE 1 TABLET EVERY NIGHT     Authorizing Provider: AFTBA QUEEN III     Ordering User: SHAKELE DORSEY    lisinopril (PRINIVIL;ZESTRIL) 20 MG tablet 90 tablet 3     Sig: TAKE 1 TABLET EVERY DAY     Authorizing Provider: AFTAB QUEEN III     Ordering User: SHAKEEL DORSEY    metoprolol succinate (TOPROL XL) 25 MG extended release tablet 90 tablet 3     Sig: TAKE 1 TABLET EVERY DAY     Authorizing Provider: AFTAB QUEEN III     Ordering User: SHAKEEL DORSEY     VO adam CACERES  Labs requested from PCP via fax    Future Appointments   Date Time Provider Department Center   9/6/2024 11:00 AM Aftab Queen III, MD [facility]

## 2024-10-07 ENCOUNTER — OFFICE VISIT (OUTPATIENT)
Age: 89
End: 2024-10-07
Payer: MEDICARE

## 2024-10-07 VITALS
DIASTOLIC BLOOD PRESSURE: 72 MMHG | OXYGEN SATURATION: 98 % | BODY MASS INDEX: 20.51 KG/M2 | SYSTOLIC BLOOD PRESSURE: 110 MMHG | WEIGHT: 127.6 LBS | HEIGHT: 66 IN | RESPIRATION RATE: 16 BRPM | HEART RATE: 68 BPM

## 2024-10-07 DIAGNOSIS — E78.2 MIXED HYPERLIPIDEMIA: ICD-10-CM

## 2024-10-07 DIAGNOSIS — I25.10 ATHEROSCLEROSIS OF NATIVE CORONARY ARTERY OF NATIVE HEART WITHOUT ANGINA PECTORIS: Primary | ICD-10-CM

## 2024-10-07 DIAGNOSIS — I10 ESSENTIAL HYPERTENSION: ICD-10-CM

## 2024-10-07 DIAGNOSIS — N18.6 ESRD (END STAGE RENAL DISEASE) (HCC): ICD-10-CM

## 2024-10-07 PROCEDURE — G8484 FLU IMMUNIZE NO ADMIN: HCPCS | Performed by: SPECIALIST

## 2024-10-07 PROCEDURE — 1036F TOBACCO NON-USER: CPT | Performed by: SPECIALIST

## 2024-10-07 PROCEDURE — G8420 CALC BMI NORM PARAMETERS: HCPCS | Performed by: SPECIALIST

## 2024-10-07 PROCEDURE — 99214 OFFICE O/P EST MOD 30 MIN: CPT | Performed by: SPECIALIST

## 2024-10-07 PROCEDURE — G8427 DOCREV CUR MEDS BY ELIG CLIN: HCPCS | Performed by: SPECIALIST

## 2024-10-07 PROCEDURE — 1123F ACP DISCUSS/DSCN MKR DOCD: CPT | Performed by: SPECIALIST

## 2024-10-07 NOTE — PROGRESS NOTES
HISTORY OF PRESENT ILLNESS  Edinson Win is a 88 y.o. male     SUMMARY:   Patient Active Problem List   Diagnosis    Coronary atherosclerosis of native coronary artery    Elevated prostate specific antigen (PSA)    Mitral regurgitation    BPH (benign prostatic hyperplasia)    ESRD (end stage renal disease) (HCC)    Bradycardia    Renal insufficiency    Undiagnosed cardiac murmurs    Lower urinary tract symptoms (LUTS)    HLD (hyperlipidemia)    Postsurgical aortocoronary bypass status    Essential hypertension            CARDIOLOGY STUDIES TO DATE:  5/14 echo normal lvef, mild MR and tr without pul htn. Aortic root 3.8cm  2010 normal lexiscan cardiolyte    Chief Complaint   Patient presents with    Coronary Artery Disease       HPI :  He is doing remarkably well.  He is tolerating dialysis without any particularly high blood pressures, and his blood pressure today is perfect.  He does take midodrine prior to dialysis because sometimes when they hooked him up his pressure will bottom out temporarily.  He does get out and walk a little bit around [employer] and so forth when he can, without any worrisome symptoms.  His weight is actually down a few pounds since we last met.  His primary care is following his lipids..    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 Bess    Bradycardia 12/21/2015    12/15 lopressor d/vianey by Dr Bess, was taking 100 mg will start 25 mg and watch for genesis     CAD (coronary artery disease)     Chronic kidney disease

## 2024-10-07 NOTE — PROGRESS NOTES
Edinson Win is a 88 y.o. male    Chief Complaint   Patient presents with    Coronary Artery Disease       /72 (Site: Left Lower Arm, Position: Sitting)   Pulse 68   Resp 16   Ht 1.676 m (5' 6\")   Wt 57.9 kg (127 lb 9.6 oz)   SpO2 98%   BMI 20.60 kg/m²         1. Have you been to the ER, urgent care clinic since your last visit?  Hospitalized since your last visit? No    2. Have you seen or consulted any other health care providers outside of the [facility] since your last visit?  Include any pap smears or colon screening. No    Learning Assessment:       No data to display                Fall Risk Assessment:      12/21/2023    12:56 PM 6/21/2023     1:03 PM 6/15/2022    11:19 AM 12/1/2021    10:46 AM 5/14/2021     1:11 PM   Amb Fall Risk Assessment and TUG Test   Do you feel unsteady or are you worried about falling?  no no      2 or more falls in past year? no no      Fall with injury in past year? no no      Fall in past 12 months?   0 0 0   Able to walk?   Yes Yes Yes       Abuse Screening:       No data to display                ADL Screening:       No data to display

## 2025-03-29 DIAGNOSIS — I10 ESSENTIAL (PRIMARY) HYPERTENSION: ICD-10-CM

## 2025-03-31 RX ORDER — AMLODIPINE BESYLATE 5 MG/1
5 TABLET ORAL DAILY
Qty: 90 TABLET | Refills: 3 | Status: SHIPPED | OUTPATIENT
Start: 2025-03-31

## 2025-03-31 NOTE — TELEPHONE ENCOUNTER
Requested Prescriptions     Signed Prescriptions Disp Refills    amLODIPine (NORVASC) 5 MG tablet 90 tablet 3     Sig: TAKE 1 TABLET EVERY DAY     Authorizing Provider: AFTAB RICKETTS III     Ordering User: SANDY HUDSON

## 2025-03-31 NOTE — PROGRESS NOTES
HISTORY OF PRESENT ILLNESS  Jeovanny Perez is a 81 y.o. male     SUMMARY:   Problem List  Date Reviewed: 5/9/2018          Codes Class Noted    Bradycardia ICD-10-CM: R00.1  ICD-9-CM: 427.89  12/21/2015    Overview Addendum 1/7/2016 10:55 AM by Fauzia Burns MD     12/15 lopressor d/neela by Dr Cherie Pablo, was taking 100 mg  will start 25 mg and watch for tamara  1/16 tolerating toprol xl 25/day             ESRD (end stage renal disease) ([facility] [address] ICD-10-CM: N18.6  ICD-9-CM: 585.6  12/14/2015    Overview Signed 2/2/2017  8:55 AM by Fauzia Burns MD     HD since 9/16 approx             Undiagnosed cardiac murmurs ICD-10-CM: R01.1  ICD-9-CM: 785.2  5/14/2014    Overview Signed 5/14/2014  3:48 PM by Fauzia Burns MD     r/o AS             Coronary atherosclerosis of native coronary artery ICD-10-CM: I25.10  ICD-9-CM: 414.01  Unknown    Overview Addendum 5/9/2017  2:06 PM by Ant Maxwell MD     Stable symptoms  1991 single vessel cabg after failed pci, details unknown             HLD (hyperlipidemia) ICD-10-CM: E78.5  ICD-9-CM: 272.4  Unknown    Overview Addendum 2/2/2017  8:46 AM by Fauzia Burns MD     8/16 [place]; 5/16 LDL32; 12/15 LDL21; 6/15  Low density lipoproteins (LDLs) are at goal, triglycerides are at goal, high density lipoproteins (HDLs) are at goal             Postsurgical aortocoronary bypass status ICD-10-CM: Z95.1  ICD-9-CM: V45.81  Unknown        Essential hypertension ICD-10-CM: I10  ICD-9-CM: 401.9  Unknown    Overview Addendum 2/2/2017  8:52 AM by Fauzia Burns MD     2/17 incr norvasc to 10;   F/U Dr. Cherie Pablo             Mitral regurgitation ICD-10-CM: I34.0  ICD-9-CM: 424.0  Unknown    Overview Addendum 5/9/2017  7:15 AM by Ant Maxwell MD     2/10 trace MR;   5/14 echo normal lvef, mild MR and tr without pul htn.  Aortic root 3.8cm             BPH (benign prostatic hyperplasia) ICD-10-CM: N40.0  ICD-9-CM: 600.00  3/14/2013        Lower urinary tract symptoms (LUTS) ICD-10-CM: R39.9  ICD-9-CM: 788.99  3/14/2013        Elevated prostate specific antigen (PSA) ICD-10-CM: R97.20  ICD-9-CM: 790.93  3/14/2013        Renal insufficiency ICD-10-CM: N28.9  ICD-9-CM: 593.9  3/14/2013              Current Outpatient Prescriptions on File Prior to Visit   Medication Sig    amLODIPine (NORVASC) 10 mg tablet TAKE 1 TABLET BY MOUTH DAILY FOR HIGH BLOOD PRESSURE    atorvastatin (LIPITOR) 80 mg tablet Take 1 Tab by mouth nightly.  amLODIPine (NORVASC) 10 mg tablet Take 1 Tab by mouth daily. Indications: hypertension    oxyCODONE-acetaminophen (PERCOCET) 5-325 mg per tablet 1 to 2 tablets every 4 hours as needed for pain    ondansetron (ZOFRAN ODT) 4 mg disintegrating tablet Take 1 Tab by mouth every eight (8) hours as needed for Nausea.  VIRT-CAPS 1 mg capsule TK ONE C PO  DAILY    aspirin delayed-release (ECOTRIN) 325 mg tablet Take 325 mg by mouth daily. Indications: myocardial infarction prevention    metoprolol succinate (TOPROL-XL) 25 mg XL tablet TAKE 1 TABLET BY MOUTH EVERY DAY    tamsulosin (FLOMAX) 0.4 mg capsule TAKE 1 CAPSULE BY MOUTH EVERY DAY    lisinopril (PRINIVIL, ZESTRIL) 20 mg tablet Take 1 Tab by mouth daily.  sevelamer carbonate (RENVELA) 800 mg tab tab Take 1,600 mg by mouth three (3) times daily (with meals).  allopurinol (ZYLOPRIM) 100 mg tablet Take 100 mg by mouth Three (3) times a week. Mondays Wednesday Fridays     No current facility-administered medications on file prior to visit. CARDIOLOGY STUDIES TO DATE:  5/14 echo normal lvef, mild MR and tr without pul htn. Aortic root 3.8cm  2010 normal lexiscan cardiolyte  Chief Complaint   Patient presents with    Coronary Artery Disease     increased swelling in legs and feet. HPI :  Mr. Gauthier Monday comes in with his daughter-in-law because he has been having trouble with some dependent lower extremity edema. He has always had this to some degree, but it has gotten a little worse lately.   He has actually lost weight and the [facility] is telling him to eat more protein, but he favors salty, fried foods and canned foods. He spends most of his time sitting with his legs down and is not getting any regular exercise. 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 Marcial Habermann Bradycardia 12/21/2015    12/15 lopressor d/neela by Dr Kandice Multani, was taking 100 mg will start 25 mg and watch for tamara     CAD (coronary artery disease)     Chronic kidney disease     on dialysis  M-W F    Coronary atherosclerosis of native coronary artery     Stable symptoms    Essential hypertension, benign     Controlled    Hypercholesteremia     Mitral valve disorders(424.0)     Nausea & vomiting     Other and unspecified hyperlipidemia     3/12 Low density lipoproteins (LDLs) are at goal, triglycerides are at goal, high density lipoproteins (HDLs) are at goal    Postsurgical aortocoronary bypass status 1991    Prostate disease        GENERAL ROS:  A comprehensive review of systems was negative except for that written in the HPI.     Visit Vitals    /70 (BP 1 Location: Right arm, BP Patient Position: Sitting)    Pulse 64    Ht 5' 6\" (1.676 m)    Wt 161 lb (73 kg)    BMI 25.99 kg/m2       Wt Readings from Last 3 Encounters:   05/09/18 161 lb (73 kg)   05/08/18 161 lb 8 oz (73.3 kg)   04/16/18 165 lb (74.8 kg)            BP Readings from Last 3 Encounters:   05/09/18 150/70   05/08/18 157/54   04/16/18 151/41       PHYSICAL EXAM  General appearance: alert, cooperative, no distress, appears stated age  Neck: supple, symmetrical, trachea midline, no adenopathy, no carotid bruit and no JVD  Lungs: clear to auscultation bilaterally  Heart: regular rate and rhythm, S1, S2 normal, no murmur, click, rub or gallop  Extremities: edema tr    Lab Results   Component Value Date/Time    Cholesterol, total 112 08/08/2016 06:53 AM    Cholesterol, total 116 07/11/2016 07:18 AM    Cholesterol, total 116 05/31/2016 07:00 AM    Cholesterol, total 125 04/11/2016 08:02 AM    Cholesterol, total 115 03/07/2016 06:48 AM    HDL Cholesterol 62 (H) 08/08/2016 06:53 AM    HDL Cholesterol 63 (H) 07/11/2016 07:18 AM    HDL Cholesterol 69 (H) 05/31/2016 07:00 AM    HDL Cholesterol 58 04/11/2016 08:02 AM    HDL Cholesterol 57 03/07/2016 06:48 AM    LDL, calculated 35.2 08/08/2016 06:53 AM    LDL, calculated 35.4 07/11/2016 07:18 AM    LDL, calculated 32.4 05/31/2016 07:00 AM    LDL, calculated 40.2 04/11/2016 08:02 AM    LDL, calculated 39.6 03/07/2016 06:48 AM    Triglyceride 74 08/08/2016 06:53 AM    Triglyceride 88 07/11/2016 07:18 AM    Triglyceride 73 05/31/2016 07:00 AM    Triglyceride 134 04/11/2016 08:02 AM    Triglyceride 92 03/07/2016 06:48 AM    CHOL/HDL Ratio 1.8 08/08/2016 06:53 AM    CHOL/HDL Ratio 1.8 07/11/2016 07:18 AM    CHOL/HDL Ratio 1.7 05/31/2016 07:00 AM    CHOL/HDL Ratio 2.2 04/11/2016 08:02 AM    CHOL/HDL Ratio 2.0 03/07/2016 06:48 AM     ASSESSMENT  I do not think Mr. Tana Norman' leg edema is related to congestive heart failure given his lack of other symptoms and other exam findings. That being said, he should not be eating all these salty foods and he does need to exercise and elevate his legs. I have also advised knee high compression hose. current treatment plan is effective, no change in therapy  lab results and schedule of future lab studies reviewed with patient  reviewed diet, exercise and weight control    Encounter Diagnoses   Name Primary?     Swelling Yes    Atherosclerosis of native coronary artery of native heart without angina pectoris     Mixed hyperlipidemia     Essential hypertension     ESRD (end stage renal disease) ([facility] [place] [address]      No orders of the defined types were placed in this encounter. Follow-up Disposition:  Return in about 6 months (around 11/9/2018).     Bear Marks MD  5/9/2018 [facility]

## 2025-06-14 DIAGNOSIS — I10 ESSENTIAL HYPERTENSION: ICD-10-CM

## 2025-06-14 DIAGNOSIS — E78.2 MIXED HYPERLIPIDEMIA: ICD-10-CM

## 2025-06-16 RX ORDER — METOPROLOL SUCCINATE 25 MG/1
25 TABLET, EXTENDED RELEASE ORAL DAILY
Qty: 90 TABLET | Refills: 0 | Status: SHIPPED | OUTPATIENT
Start: 2025-06-16

## 2025-06-16 RX ORDER — ATORVASTATIN CALCIUM 80 MG/1
80 TABLET, FILM COATED ORAL NIGHTLY
Qty: 90 TABLET | Refills: 0 | Status: SHIPPED | OUTPATIENT
Start: 2025-06-16

## 2025-06-16 RX ORDER — LISINOPRIL 20 MG/1
20 TABLET ORAL DAILY
Qty: 90 TABLET | Refills: 0 | Status: SHIPPED | OUTPATIENT
Start: 2025-06-16

## 2025-06-16 NOTE — TELEPHONE ENCOUNTER
PCP: Jay Jay Faulkner MD    Last appt: 10/7/2024   No future appointments.    Requested Prescriptions     Signed Prescriptions Disp Refills    metoprolol succinate (TOPROL XL) 25 MG extended release tablet 90 tablet 0     Sig: TAKE 1 TABLET EVERY DAY     Authorizing Provider: AFTAB RICKETTS III     Ordering User: NATALIE FRANCO    lisinopril (PRINIVIL;ZESTRIL) 20 MG tablet 90 tablet 0     Sig: TAKE 1 TABLET EVERY DAY     Authorizing Provider: AFTAB RICKETTS III     Ordering User: NATALIE FRANCO    atorvastatin (LIPITOR) 80 MG tablet 90 tablet 0     Sig: TAKE 1 TABLET EVERY NIGHT     Authorizing Provider: AFTAB RICKETTS III     Ordering User: NATALIE FRANCO         Other Comments:  Verbal order per provider.  Order (medication, dose, route, frequency, amount, refills) repeated and verified twice.  Labs requested from pcp.

## (undated) DEVICE — DERMABOND SKIN ADH 0.7ML -- DERMABOND ADVANCED 12/BX

## (undated) DEVICE — DEVON™ KNEE AND BODY STRAP 60" X 3" (1.5 M X 7.6 CM): Brand: DEVON

## (undated) DEVICE — REM POLYHESIVE ADULT PATIENT RETURN ELECTRODE: Brand: VALLEYLAB

## (undated) DEVICE — ELECTRO LUBE IS A SINGLE PATIENT USE DEVICE THAT IS INTENDED TO BE USED ON ELECTROSURGICAL ELECTRODES TO REDUCE STICKING.: Brand: KEY SURGICAL ELECTRO LUBE

## (undated) DEVICE — CLICKLINE SCISSORS INSERT: Brand: CLICKLINE

## (undated) DEVICE — COVER,TABLE,60X90,STERILE: Brand: MEDLINE

## (undated) DEVICE — INFECTION CONTROL KIT SYS

## (undated) DEVICE — KENDALL SCD EXPRESS SLEEVES, KNEE LENGTH, MEDIUM: Brand: KENDALL SCD

## (undated) DEVICE — AIRSEAL BIFURCATED SMOKE EVAC FILTERED TUBE SET: Brand: AIRSEAL

## (undated) DEVICE — SEAL UNIV 5-8MM DISP BX/10 -- DA VINCI XI - SNGL USE

## (undated) DEVICE — STERILE POLYISOPRENE POWDER-FREE SURGICAL GLOVES: Brand: PROTEXIS

## (undated) DEVICE — BLADELESS OPTICAL TROCAR WITH FIXATION CANNULA: Brand: VERSAPORT

## (undated) DEVICE — VISUALIZATION SYSTEM: Brand: CLEARIFY

## (undated) DEVICE — SUTURE SZ 0 27IN 5/8 CIR UR-6  TAPER PT VIOLET ABSRB VICRYL J603H

## (undated) DEVICE — 3M™ IOBAN™ 2 ANTIMICROBIAL INCISE DRAPE 6650EZ: Brand: IOBAN™ 2

## (undated) DEVICE — DRAPE,REIN 53X77,STERILE: Brand: MEDLINE

## (undated) DEVICE — LIGHT HANDLE: Brand: DEVON

## (undated) DEVICE — ARM DRAPE

## (undated) DEVICE — SUTURE VLOC 90 2/0 VL 6 GS-22 VLOCM2105

## (undated) DEVICE — BLADELESS OBTURATOR: Brand: WECK VISTA

## (undated) DEVICE — COVER,MAYO STAND,STERILE: Brand: MEDLINE

## (undated) DEVICE — (D)PREP SKN CHLRAPRP APPL 26ML -- CONVERT TO ITEM 371833

## (undated) DEVICE — NEEDLE HYPO 22GA L1.5IN BLK S STL HUB POLYPR SHLD REG BVL

## (undated) DEVICE — TIP COVER ACCESSORY

## (undated) DEVICE — SUTURE MCRYL SZ 4-0 L27IN ABSRB UD L19MM PS-2 1/2 CIR PRIM Y426H

## (undated) DEVICE — SOL IRRIGATION INJ NACL 0.9% 500ML BTL

## (undated) DEVICE — SURGICAL PROCEDURE KIT GEN LAPAROSCOPY LF

## (undated) DEVICE — DEVICE TRNSF SPIK STL 2008S] MICROTEK MEDICAL INC]

## (undated) DEVICE — BLADELESS OPTICAL TROCAR WITH FIXATION CANNULA: Brand: VERSAONE